# Patient Record
Sex: FEMALE | Race: BLACK OR AFRICAN AMERICAN | Employment: OTHER | URBAN - METROPOLITAN AREA
[De-identification: names, ages, dates, MRNs, and addresses within clinical notes are randomized per-mention and may not be internally consistent; named-entity substitution may affect disease eponyms.]

---

## 2017-04-17 ENCOUNTER — TELEPHONE (OUTPATIENT)
Dept: PODIATRY CLINIC | Facility: CLINIC | Age: 77
End: 2017-04-17

## 2017-04-25 ENCOUNTER — OFFICE VISIT (OUTPATIENT)
Dept: PODIATRY CLINIC | Facility: CLINIC | Age: 77
End: 2017-04-25

## 2017-04-25 DIAGNOSIS — E11.9 TYPE 2 DIABETES MELLITUS WITHOUT COMPLICATION, WITHOUT LONG-TERM CURRENT USE OF INSULIN (HCC): Primary | ICD-10-CM

## 2017-04-25 PROCEDURE — 99203 OFFICE O/P NEW LOW 30 MIN: CPT | Performed by: PODIATRIST

## 2017-04-25 PROCEDURE — G0463 HOSPITAL OUTPT CLINIC VISIT: HCPCS | Performed by: PODIATRIST

## 2017-04-25 NOTE — PROGRESS NOTES
HPI:    Patient ID: Deb Gutierrez is a 78year old female. HPI  This pleasant 27-year-old female presents as a new patient to me and states that she is self-referred. She states she is here for a diabetic foot evaluation.   She states that she has been [facility]

## 2021-02-03 ENCOUNTER — OFFICE VISIT (OUTPATIENT)
Dept: FAMILY MEDICINE CLINIC | Facility: CLINIC | Age: 81
End: 2021-02-03
Payer: MEDICARE

## 2021-02-03 VITALS
HEIGHT: 62 IN | DIASTOLIC BLOOD PRESSURE: 84 MMHG | SYSTOLIC BLOOD PRESSURE: 146 MMHG | TEMPERATURE: 99 F | BODY MASS INDEX: 34.38 KG/M2 | HEART RATE: 105 BPM | RESPIRATION RATE: 18 BRPM | WEIGHT: 186.81 LBS

## 2021-02-03 DIAGNOSIS — Z79.4 TYPE 2 DIABETES MELLITUS WITHOUT COMPLICATION, WITH LONG-TERM CURRENT USE OF INSULIN (HCC): ICD-10-CM

## 2021-02-03 DIAGNOSIS — Z85.3 HISTORY OF BREAST CANCER: ICD-10-CM

## 2021-02-03 DIAGNOSIS — Z12.31 BREAST CANCER SCREENING BY MAMMOGRAM: ICD-10-CM

## 2021-02-03 DIAGNOSIS — R25.1 TREMOR: ICD-10-CM

## 2021-02-03 DIAGNOSIS — E11.9 TYPE 2 DIABETES MELLITUS WITHOUT COMPLICATION, WITH LONG-TERM CURRENT USE OF INSULIN (HCC): ICD-10-CM

## 2021-02-03 DIAGNOSIS — G89.29 CHRONIC PAIN OF RIGHT KNEE: ICD-10-CM

## 2021-02-03 DIAGNOSIS — E78.5 HYPERLIPIDEMIA, UNSPECIFIED HYPERLIPIDEMIA TYPE: ICD-10-CM

## 2021-02-03 DIAGNOSIS — I10 ESSENTIAL HYPERTENSION: ICD-10-CM

## 2021-02-03 DIAGNOSIS — M25.561 CHRONIC PAIN OF RIGHT KNEE: ICD-10-CM

## 2021-02-03 DIAGNOSIS — R49.9 CHANGE IN VOICE: ICD-10-CM

## 2021-02-03 DIAGNOSIS — Z00.00 ANNUAL PHYSICAL EXAM: Primary | ICD-10-CM

## 2021-02-03 PROCEDURE — G0439 PPPS, SUBSEQ VISIT: HCPCS | Performed by: FAMILY MEDICINE

## 2021-02-03 PROCEDURE — 3008F BODY MASS INDEX DOCD: CPT | Performed by: FAMILY MEDICINE

## 2021-02-03 PROCEDURE — 3077F SYST BP >= 140 MM HG: CPT | Performed by: FAMILY MEDICINE

## 2021-02-03 PROCEDURE — 3079F DIAST BP 80-89 MM HG: CPT | Performed by: FAMILY MEDICINE

## 2021-02-03 PROCEDURE — 96160 PT-FOCUSED HLTH RISK ASSMT: CPT | Performed by: FAMILY MEDICINE

## 2021-02-03 PROCEDURE — 99387 INIT PM E/M NEW PAT 65+ YRS: CPT | Performed by: FAMILY MEDICINE

## 2021-02-03 RX ORDER — LATANOPROST 50 UG/ML
1 SOLUTION/ DROPS OPHTHALMIC NIGHTLY
COMMUNITY
End: 2021-02-14

## 2021-02-03 RX ORDER — PEN NEEDLE, DIABETIC 32GX 5/32"
NEEDLE, DISPOSABLE MISCELLANEOUS
Qty: 100 EACH | Refills: 1 | Status: SHIPPED | OUTPATIENT
Start: 2021-02-03 | End: 2022-01-17

## 2021-02-03 RX ORDER — BLOOD SUGAR DIAGNOSTIC
STRIP MISCELLANEOUS
COMMUNITY
Start: 2021-01-23 | End: 2021-09-08

## 2021-02-03 RX ORDER — ATORVASTATIN CALCIUM 80 MG/1
80 TABLET, FILM COATED ORAL DAILY
COMMUNITY
End: 2021-02-14

## 2021-02-03 RX ORDER — FLUTICASONE PROPIONATE 50 MCG
SPRAY, SUSPENSION (ML) NASAL DAILY
COMMUNITY

## 2021-02-03 RX ORDER — PEN NEEDLE, DIABETIC 32GX 5/32"
NEEDLE, DISPOSABLE MISCELLANEOUS
COMMUNITY
Start: 2020-11-23 | End: 2021-02-03

## 2021-02-03 RX ORDER — LISINOPRIL 10 MG/1
TABLET ORAL
COMMUNITY
Start: 2020-11-18 | End: 2021-02-16

## 2021-02-03 NOTE — PROGRESS NOTES
HPI:   Jerry Mcbride is a 80year old female who presents for a Medicare annual physical exam.   New patient, looking to establish care. Type 2 diabetes-chronic issue for patient. Has not had labs in a while. Takes insulin and Metformin.   Feels Metformin and Family/surrogate (if present), and forms available to patient in AVS       She has never smoked tobacco.    CAGE Alcohol screening   Star Lua was screened for Alcohol abuse and had a score of 0 so is at low risk.     Patient Care Team: Patient Care T She reports that she does not drink alcohol or use drugs. REVIEW OF SYSTEMS:   Review of Systems   All other systems reviewed and are negative.     EXAM:   /84   Pulse 105   Temp 98.7 °F (37.1 °C) (Temporal)   Resp 18   Ht 5' 2\" (1.575 m)   Wt 18 ASSESSMENT AND OTHER RELEVANT CHRONIC CONDITIONS:   Shayna Zepeda is a 80year old female who presents for a Medicare Assessment.      PLAN SUMMARY:   Diagnoses and all orders for this visit:    Annual physical exam  -     CBC WITH DIFFERENTIAL WITH PLATE No  How does the patient maintain a good energy level?: Daily Walks  How would you describe your current health state?: Fair  How do you maintain positive mental well-being?: Games;Puzzles      This section provided for quick review of chart, silvina juarez Annually No vaccine history found Please get every year    Pneumococcal 13 (Prevnar)  Covered Once after 65 No vaccine history found Please get once after your 65th birthday    Pneumococcal 23 (Pneumovax)  Covered Once after 65 No vaccine history found Ple

## 2021-02-04 LAB
ABSOLUTE BASOPHILS: 40 CELLS/UL (ref 0–200)
ABSOLUTE EOSINOPHILS: 119 CELLS/UL (ref 15–500)
ABSOLUTE LYMPHOCYTES: 1747 CELLS/UL (ref 850–3900)
ABSOLUTE MONOCYTES: 370 CELLS/UL (ref 200–950)
ABSOLUTE NEUTROPHILS: 2125 CELLS/UL (ref 1500–7800)
ALBUMIN/GLOBULIN RATIO: 1.2 (CALC) (ref 1–2.5)
ALBUMIN: 4.1 G/DL (ref 3.6–5.1)
ALKALINE PHOSPHATASE: 79 U/L (ref 37–153)
ALT: 13 U/L (ref 6–29)
AST: 13 U/L (ref 10–35)
BASOPHILS: 0.9 %
BILIRUBIN, TOTAL: 0.4 MG/DL (ref 0.2–1.2)
BUN/CREATININE RATIO: 14 (CALC) (ref 6–22)
BUN: 17 MG/DL (ref 7–25)
CALCIUM: 9.7 MG/DL (ref 8.6–10.4)
CARBON DIOXIDE: 24 MMOL/L (ref 20–32)
CHLORIDE: 104 MMOL/L (ref 98–110)
CHOL/HDLC RATIO: 4.1 (CALC)
CHOLESTEROL, TOTAL: 151 MG/DL
CREATININE, RANDOM URINE: 163 MG/DL (ref 20–275)
CREATININE: 1.2 MG/DL (ref 0.6–0.93)
EGFR IF AFRICN AM: 50 ML/MIN/1.73M2
EGFR IF NONAFRICN AM: 43 ML/MIN/1.73M2
EOSINOPHILS: 2.7 %
GLOBULIN: 3.3 G/DL (CALC) (ref 1.9–3.7)
GLUCOSE: 225 MG/DL (ref 65–99)
HDL CHOLESTEROL: 37 MG/DL
HEMATOCRIT: 37.9 % (ref 35–45)
HEMOGLOBIN A1C: 9.3 % OF TOTAL HGB
HEMOGLOBIN: 12 G/DL (ref 11.7–15.5)
LDL-CHOLESTEROL: 95 MG/DL (CALC)
LYMPHOCYTES: 39.7 %
MCH: 26.4 PG (ref 27–33)
MCHC: 31.7 G/DL (ref 32–36)
MCV: 83.5 FL (ref 80–100)
MICROALBUMIN/CREATININE RATIO, RANDOM URINE: 26 MCG/MG CREAT
MICROALBUMIN: 4.2 MG/DL
MONOCYTES: 8.4 %
MPV: 12.3 FL (ref 7.5–12.5)
NEUTROPHILS: 48.3 %
NON-HDL CHOLESTEROL: 114 MG/DL (CALC)
PLATELET COUNT: 255 THOUSAND/UL (ref 140–400)
POTASSIUM: 4.6 MMOL/L (ref 3.5–5.3)
PROTEIN, TOTAL: 7.4 G/DL (ref 6.1–8.1)
RDW: 13.7 % (ref 11–15)
RED BLOOD CELL COUNT: 4.54 MILLION/UL (ref 3.8–5.1)
SODIUM: 137 MMOL/L (ref 135–146)
TRIGLYCERIDES: 98 MG/DL
TSH W/REFLEX TO FT4: 1.21 MIU/L (ref 0.4–4.5)
WHITE BLOOD CELL COUNT: 4.4 THOUSAND/UL (ref 3.8–10.8)

## 2021-02-05 DIAGNOSIS — Z23 NEED FOR VACCINATION: ICD-10-CM

## 2021-02-09 ENCOUNTER — PATIENT MESSAGE (OUTPATIENT)
Dept: FAMILY MEDICINE CLINIC | Facility: CLINIC | Age: 81
End: 2021-02-09

## 2021-02-09 ENCOUNTER — TELEPHONE (OUTPATIENT)
Dept: CASE MANAGEMENT | Age: 81
End: 2021-02-09

## 2021-02-09 DIAGNOSIS — Z79.4 TYPE 2 DIABETES MELLITUS WITHOUT COMPLICATION, WITH LONG-TERM CURRENT USE OF INSULIN (HCC): Primary | ICD-10-CM

## 2021-02-09 DIAGNOSIS — E11.9 TYPE 2 DIABETES MELLITUS WITHOUT COMPLICATION, WITH LONG-TERM CURRENT USE OF INSULIN (HCC): Primary | ICD-10-CM

## 2021-02-09 NOTE — TELEPHONE ENCOUNTER
Patient has a pending appointment with Dr. Rj Diane on 3/24/2021. Routed to provider to confirm if the patient should be seen sooner.

## 2021-02-09 NOTE — TELEPHONE ENCOUNTER
From: Medina Marley  To: Marcio Hartman MD  Sent: 2/9/2021 3:28 PM CST  Subject: Referral Request    I have a question about COMP METABOLIC PANEL (14) resulted on 2/4/21, 8:16 AM.    The next available is not until 3/24. Is that ok.      Kasey Fields

## 2021-02-09 NOTE — TELEPHONE ENCOUNTER
Sylvain Davis, is there anyway you can accommodate this patient sooner  Type 2 DM, elevated creatinine on insulin with an A1C of 9.3%, thanks

## 2021-02-09 NOTE — TELEPHONE ENCOUNTER
Hi Dr. Jevon Torres,    I am working on the referral you submitted for Nancy to see Dr. Maria C Carroll. Dr. Agata Valerio is not in the patient's network. Please redirect to an [address].     Thank you  Lauren Lanier

## 2021-02-10 ENCOUNTER — PATIENT MESSAGE (OUTPATIENT)
Dept: FAMILY MEDICINE CLINIC | Facility: CLINIC | Age: 81
End: 2021-02-10

## 2021-02-10 ENCOUNTER — PATIENT MESSAGE (OUTPATIENT)
Dept: ENDOCRINOLOGY CLINIC | Facility: CLINIC | Age: 81
End: 2021-02-10

## 2021-02-10 ENCOUNTER — OFFICE VISIT (OUTPATIENT)
Dept: ENDOCRINOLOGY CLINIC | Facility: CLINIC | Age: 81
End: 2021-02-10
Payer: MEDICARE

## 2021-02-10 VITALS
WEIGHT: 188 LBS | BODY MASS INDEX: 34 KG/M2 | HEART RATE: 88 BPM | SYSTOLIC BLOOD PRESSURE: 166 MMHG | DIASTOLIC BLOOD PRESSURE: 82 MMHG

## 2021-02-10 DIAGNOSIS — E11.22 TYPE 2 DIABETES MELLITUS WITH CHRONIC KIDNEY DISEASE, WITH LONG-TERM CURRENT USE OF INSULIN, UNSPECIFIED CKD STAGE (HCC): Primary | ICD-10-CM

## 2021-02-10 DIAGNOSIS — Z79.4 TYPE 2 DIABETES MELLITUS WITH CHRONIC KIDNEY DISEASE, WITH LONG-TERM CURRENT USE OF INSULIN, UNSPECIFIED CKD STAGE (HCC): Primary | ICD-10-CM

## 2021-02-10 DIAGNOSIS — E78.5 DYSLIPIDEMIA: ICD-10-CM

## 2021-02-10 PROBLEM — E11.9 TYPE 2 DIABETES MELLITUS (HCC): Status: ACTIVE | Noted: 2021-02-10

## 2021-02-10 PROCEDURE — 3079F DIAST BP 80-89 MM HG: CPT | Performed by: INTERNAL MEDICINE

## 2021-02-10 PROCEDURE — 3077F SYST BP >= 140 MM HG: CPT | Performed by: INTERNAL MEDICINE

## 2021-02-10 PROCEDURE — 99204 OFFICE O/P NEW MOD 45 MIN: CPT | Performed by: INTERNAL MEDICINE

## 2021-02-10 RX ORDER — METFORMIN HYDROCHLORIDE 500 MG/1
500 TABLET, EXTENDED RELEASE ORAL 2 TIMES DAILY WITH MEALS
Qty: 180 TABLET | Refills: 0 | Status: SHIPPED | OUTPATIENT
Start: 2021-02-10 | End: 2021-02-10

## 2021-02-10 RX ORDER — DULAGLUTIDE 0.75 MG/.5ML
0.75 INJECTION, SOLUTION SUBCUTANEOUS
Qty: 6 ML | Refills: 0 | Status: SHIPPED | OUTPATIENT
Start: 2021-02-10 | End: 2021-02-23

## 2021-02-10 NOTE — TELEPHONE ENCOUNTER
Per Dr. Rj Diane,     Put the patient at 0900 spot for tomorrow but have the patient come at [address] [place] [address]. Patient was notified and reviewed [facility] location.      Future Appointments   Date Time Provider Ebonie Howard   2/10/2021  9:00 AM Kady Camara,

## 2021-02-10 NOTE — TELEPHONE ENCOUNTER
From: Evangelist Aragon  To: Dorota Sheehan MD  Sent: 2/10/2021 4:05 PM CST  Subject: Prescription Question    Hi,      So I hv gone thru my Mom's prescription expenses. Her Lantis is 481 and Trulicity 028 that's just 2 of many.      Because I hv such great Count

## 2021-02-10 NOTE — H&P
New Patient Visit - Diabetes    CONSULT - Reason for Visit:  Diabetes management.     Requesting Physician: Dr. Wright Cabot:  Patient presents with:  Diabetes: Pt is present to establish care with a new endo doctor        HISTORY OF PRESANKIT MASTECTOMY RIGHT         ALLERGIES:    Penicillins             RASH  Whole Food Multivit*    HIVES  Iodine I 131 Tositu*        SOCIAL HISTORY:    Social History    Socioeconomic History      Marital status:        Spouse name: Not on file      Numbe hearing, normal speech  Back: no kyphosis  Respiratory:  Speaking in full sentences, non-labored.  no increased work of breathing, no audible wheezing    Skin:  normal moisture and skin texture, no visible lesions  Hair and nails: normal scalp hair  Hematol therapy  Discussed the potential side effects of statins including muscle and liver injury.     RTC in 3 months  Call with Bg as discussed        2/10/2021  Daxa Berkowitz MD

## 2021-02-10 NOTE — TELEPHONE ENCOUNTER
From: Juliet Esteban  To: Deepak Ramirez MD  Sent: 2/10/2021 1:34 PM CST  Subject: Prescription Question    Hello  My Mother Juliet Esteban was prescribed Trulicity this morning it's $141.  I don't know if she is going to be able to afford this on an ongoing ba

## 2021-02-10 NOTE — TELEPHONE ENCOUNTER
Dr. Drake Esparza,     Please advise on below. Below cost is for the 90 days supply. Copay card will not work as she has Medicare advantage plan.

## 2021-02-10 NOTE — PATIENT INSTRUCTIONS
Continue with lantus   STOP metformin   Start trulicity 5.15 mg once a week  Check your sugars before breakfast maurisio days and before dinner some days  Call me with blood sugars in 2 weeks    [phone]

## 2021-02-10 NOTE — TELEPHONE ENCOUNTER
She has SE with regular  MTF   Given her renal function some medications are not recommended   Please make sure they know that this cost is for 3 months, can they  one month at at time, if not affordable, we could try:   MTF  mg BF and dinner

## 2021-02-11 NOTE — TELEPHONE ENCOUNTER
rn called patient and spoke with Liliana Mondragon , and gave her message below by dr Crissy Herman. she states that per dr Linda Stewart she can't be on metformin given kidney condition? I briefly mentioned Kaur Lucero but can she be on this medication? Please advise.   I explained

## 2021-02-12 ENCOUNTER — PATIENT MESSAGE (OUTPATIENT)
Dept: FAMILY MEDICINE CLINIC | Facility: CLINIC | Age: 81
End: 2021-02-12

## 2021-02-12 NOTE — TELEPHONE ENCOUNTER
Spoke to patient's daughter, Angel Stokes, to relay message below. Daughter stated understanding for patient to continue with MTF until trulicity started.       F/U scheduled 5/12/21    Daughter states insulin in pen is easier for patient than insulin in vials

## 2021-02-12 NOTE — TELEPHONE ENCOUNTER
Trulicity prescription is for 6 ml  Also, she should please take the  mg BID till she gets the trulicity  Her GGFr ( measure your kidney function) is 50, and we typically recommend stopping MTF when it is below 40  Thanks

## 2021-02-12 NOTE — TELEPHONE ENCOUNTER
From: Kasey Kurtz  To: Maria Antonia Landrum MD  Sent: 2/12/2021 11:31 AM CST  Subject: Prescription Question    Gm,     Just wanted to let you know my Mom usually gets the prefilled Lantus and when it was sent to pharmacy it was the vials instead.    She shakes t

## 2021-02-12 NOTE — TELEPHONE ENCOUNTER
Dr. Jillian Helms,  Spoke to [place], patient's daughter, -she states pharmacy won't have Trulicity until Tuesday - asking if patient should continue taking MTF (657IG BID) until trulicity has been picked up -please advise-thanks

## 2021-02-13 NOTE — TELEPHONE ENCOUNTER
[facility] pharmacy is requesting a new rx for   ATORVASTATIN 80 MG   LATANOPROST EYE DROPS   LISINOPRIL 10MG TABLETS    ALSO LISINOPRIL 20MG TABLETS.      PLS ADVISE ON THESE REFILLS

## 2021-02-14 NOTE — TELEPHONE ENCOUNTER
I pended the first 2 meds, but the Lisinopril 10mg does not have a sig, qty or refill info entered on med list.     Also, pt does not have Lisinopril 20mg on her active med list    Please advise.

## 2021-02-15 RX ORDER — ATORVASTATIN CALCIUM 80 MG/1
80 TABLET, FILM COATED ORAL DAILY
Qty: 90 TABLET | Refills: 1 | Status: SHIPPED | OUTPATIENT
Start: 2021-02-15 | End: 2021-09-08

## 2021-02-15 RX ORDER — LATANOPROST 50 UG/ML
1 SOLUTION/ DROPS OPHTHALMIC NIGHTLY
Qty: 3 BOTTLE | Refills: 1 | Status: SHIPPED | OUTPATIENT
Start: 2021-02-15 | End: 2021-04-07

## 2021-02-16 ENCOUNTER — TELEPHONE (OUTPATIENT)
Dept: OBGYN CLINIC | Facility: CLINIC | Age: 81
End: 2021-02-16

## 2021-02-16 RX ORDER — LANCETS
EACH MISCELLANEOUS
Qty: 100 EACH | Refills: 1 | Status: SHIPPED | OUTPATIENT
Start: 2021-02-16 | End: 2021-04-18

## 2021-02-16 RX ORDER — LISINOPRIL 10 MG/1
10 TABLET ORAL DAILY
Qty: 90 TABLET | Refills: 1 | Status: SHIPPED | OUTPATIENT
Start: 2021-02-16 | End: 2021-08-17

## 2021-02-16 NOTE — TELEPHONE ENCOUNTER
Called patient to confirm if refill was needed in separate encounter. Reports she has enough supply of everything except the lancets. LOV 2/10/21. Pended 3 month supply.

## 2021-02-16 NOTE — TELEPHONE ENCOUNTER
I called the patient and daughter who stated she is taking the Lisinopril 10 mg once a day. Received from her previous physician. I pended as per patient stated. We had as historical.   Pended for #90 1 refill.

## 2021-02-16 NOTE — TELEPHONE ENCOUNTER
•  Glucose Blood (ACCU-CHEK ARI PLUS) In Vitro Strip    - ACCU-CHEK ARI SOLUTION    -ACCU-CHEK SOFTCLIX LANCETS    -BD SINGLE USE SWAB

## 2021-02-16 NOTE — TELEPHONE ENCOUNTER
Pharmacy refill request for;    Accu-Check Qiana Plus Meter    •  Dulaglutide (TRULICITY) 4.87 XY/3.5OZ Subcutaneous Solution Pen-injector, Inject 0.75 mg into the skin every 7 days. , Disp: 6 mL, Rfl: 0      •  insulin glargine 100 UNIT/ML Subcutaneous Sol

## 2021-02-16 NOTE — TELEPHONE ENCOUNTER
Refill on 2/03/21. Called patient to confirm. States she did not request the refill and she has enough supply for now.

## 2021-02-17 ENCOUNTER — TELEPHONE (OUTPATIENT)
Dept: NEUROLOGY | Facility: CLINIC | Age: 81
End: 2021-02-17

## 2021-02-17 ENCOUNTER — OFFICE VISIT (OUTPATIENT)
Dept: NEUROLOGY | Facility: CLINIC | Age: 81
End: 2021-02-17
Payer: MEDICARE

## 2021-02-17 VITALS
BODY MASS INDEX: 33.13 KG/M2 | DIASTOLIC BLOOD PRESSURE: 70 MMHG | WEIGHT: 180 LBS | OXYGEN SATURATION: 97 % | SYSTOLIC BLOOD PRESSURE: 160 MMHG | HEIGHT: 62 IN | HEART RATE: 96 BPM

## 2021-02-17 DIAGNOSIS — G20 PARKINSONISM, UNSPECIFIED PARKINSONISM TYPE (HCC): Primary | ICD-10-CM

## 2021-02-17 DIAGNOSIS — R41.3 GRADUAL-ONSET MEMORY IMPAIRMENT: ICD-10-CM

## 2021-02-17 DIAGNOSIS — R49.9 PATHOLOGIC CHANGE OF VOICE: ICD-10-CM

## 2021-02-17 PROCEDURE — 99205 OFFICE O/P NEW HI 60 MIN: CPT | Performed by: OTHER

## 2021-02-17 PROCEDURE — 3078F DIAST BP <80 MM HG: CPT | Performed by: OTHER

## 2021-02-17 PROCEDURE — 3077F SYST BP >= 140 MM HG: CPT | Performed by: OTHER

## 2021-02-17 PROCEDURE — 3008F BODY MASS INDEX DOCD: CPT | Performed by: OTHER

## 2021-02-17 NOTE — TELEPHONE ENCOUNTER
Dr. Ella Mo is out of network with Pt's insurance plan. Will need new referral to either [facility] or Dr. Kt Ye. Will inform Nursing.

## 2021-02-17 NOTE — TELEPHONE ENCOUNTER
Artur for authorization of approval for MRI brain wo cpt code 26410. Authorization Number [id number] effective 02/17/21 to 03/19/21     Raymond Pandya MA O to verify if authorization is required for.  NM BRAIN CORINNE SCAN SPECT (SINGLE) 1 DA

## 2021-02-17 NOTE — PROGRESS NOTES
[address] [address]  [address]  [facility]  [phone]          [facility]  NEW PATIENT EVALUATION  Reason for Admission/Consultation:  Parkinsonism    Requested by:   PCP: Lanre Valerio MD + loss of taste.   -Hypomimia Yes  -Hypophonia Yes    Sleep   -REM behavioral disorder (RBD) symptoms No    Autonomic   -Bowel/bladder control No  -Sialorrhea No  -Swallowing No  -Orthostatic hypotension symptoms Yes    States I can't cry;  States when she No    Psychosis:   Suicidal or homicidal ideations: No  Obsessions, compulsions, or hoarding: No    Gait/Falls:  -only once      (+) anorexia; decreased appetite.      Passed out after getting up from the toilet   was sitting on the toilet; got up, states h •  Accu-Chek Softclix Lancets Does not apply Misc, Use to test blood sugars twice daily. , Disp: 100 each, Rfl: 1  •  latanoprost 0.005 % Ophthalmic Solution, Place 1 drop into the left eye nightly., Disp: 3 Bottle, Rfl: 1  •  Glucose Blood (ACCU-CHEK ROSA apraxia, anosognosia, or R/L confusion.   - Cranial Nerves: No gaze preference. Visual fields: Full pupils are 3 mm briskly constricting to 2 mm and equally round and reactive to light  in a well lit room. EOMI. No nystagmus. No ptosis.  V1-V3 intact B/L to turning  Retropulsion:Initially (+) retropulsion but then later no positive retropulsion       Data reviewed    Test results/Imaging:   No results found for: TSH  Lab Results   Component Value Date    HDL 37 (L) 02/03/2021    LDL 95 02/03/2021    TRIG 98 0 essential tremor that then progresses to parkinsonian symptoms. My suspicion for Parkinson disease was initially higher however, on reviewing the records from her ENT visit from this past March my suspicion may be lower.   She does have exam features that

## 2021-02-18 ENCOUNTER — IMMUNIZATION (OUTPATIENT)
Dept: LAB | Age: 81
End: 2021-02-18
Attending: HOSPITALIST
Payer: MEDICARE

## 2021-02-18 ENCOUNTER — HOSPITAL ENCOUNTER (OUTPATIENT)
Dept: GENERAL RADIOLOGY | Age: 81
Discharge: HOME | End: 2021-02-18
Attending: FAMILY MEDICINE
Payer: MEDICARE

## 2021-02-18 ENCOUNTER — HOSPITAL ENCOUNTER (OUTPATIENT)
Dept: MAMMOGRAPHY | Age: 81
Discharge: HOME | End: 2021-02-18
Attending: FAMILY MEDICINE
Payer: MEDICARE

## 2021-02-18 DIAGNOSIS — Z12.31 BREAST CANCER SCREENING BY MAMMOGRAM: ICD-10-CM

## 2021-02-18 DIAGNOSIS — Z23 NEED FOR VACCINATION: Primary | ICD-10-CM

## 2021-02-18 DIAGNOSIS — G89.29 CHRONIC PAIN OF RIGHT KNEE: ICD-10-CM

## 2021-02-18 DIAGNOSIS — M25.561 CHRONIC PAIN OF RIGHT KNEE: ICD-10-CM

## 2021-02-18 PROCEDURE — 77063 BREAST TOMOSYNTHESIS BI: CPT | Performed by: FAMILY MEDICINE

## 2021-02-18 PROCEDURE — 77067 SCR MAMMO BI INCL CAD: CPT | Performed by: FAMILY MEDICINE

## 2021-02-18 PROCEDURE — 73562 X-RAY EXAM OF KNEE 3: CPT | Performed by: FAMILY MEDICINE

## 2021-02-18 PROCEDURE — 0001A SARSCOV2 VAC 30MCG/0.3ML IM: CPT

## 2021-02-18 PROCEDURE — 73564 X-RAY EXAM KNEE 4 OR MORE: CPT | Performed by: FAMILY MEDICINE

## 2021-02-23 ENCOUNTER — OFFICE VISIT (OUTPATIENT)
Dept: PHYSICAL THERAPY | Age: 81
End: 2021-02-23
Attending: Other
Payer: MEDICARE

## 2021-02-23 ENCOUNTER — PATIENT MESSAGE (OUTPATIENT)
Dept: FAMILY MEDICINE CLINIC | Facility: CLINIC | Age: 81
End: 2021-02-23

## 2021-02-23 ENCOUNTER — TELEPHONE (OUTPATIENT)
Dept: NEUROLOGY | Facility: CLINIC | Age: 81
End: 2021-02-23

## 2021-02-23 ENCOUNTER — TELEPHONE (OUTPATIENT)
Dept: PHYSICAL THERAPY | Facility: HOSPITAL | Age: 81
End: 2021-02-23

## 2021-02-23 DIAGNOSIS — R49.9 PATHOLOGIC CHANGE OF VOICE: ICD-10-CM

## 2021-02-23 DIAGNOSIS — R41.3 GRADUAL-ONSET MEMORY IMPAIRMENT: ICD-10-CM

## 2021-02-23 DIAGNOSIS — G89.29 CHRONIC PAIN OF RIGHT KNEE: Primary | ICD-10-CM

## 2021-02-23 DIAGNOSIS — G20 PARKINSONISM, UNSPECIFIED PARKINSONISM TYPE (HCC): ICD-10-CM

## 2021-02-23 DIAGNOSIS — M25.561 CHRONIC PAIN OF RIGHT KNEE: Primary | ICD-10-CM

## 2021-02-23 DIAGNOSIS — G20 PARKINSONISM, UNSPECIFIED PARKINSONISM TYPE (HCC): Primary | ICD-10-CM

## 2021-02-23 PROCEDURE — 97162 PT EVAL MOD COMPLEX 30 MIN: CPT

## 2021-02-23 PROCEDURE — 97110 THERAPEUTIC EXERCISES: CPT

## 2021-02-23 RX ORDER — BLOOD GLUCOSE CONTROL HIGH,LOW
EACH MISCELLANEOUS
Qty: 1 EACH | Refills: 1 | Status: SHIPPED | OUTPATIENT
Start: 2021-02-23

## 2021-02-23 RX ORDER — DULAGLUTIDE 0.75 MG/.5ML
0.75 INJECTION, SOLUTION SUBCUTANEOUS
Qty: 6 ML | Refills: 0 | Status: SHIPPED | OUTPATIENT
Start: 2021-02-23 | End: 2021-04-19 | Stop reason: ALTCHOICE

## 2021-02-23 RX ORDER — GLUCOSAMINE HCL/CHONDROITIN SU 500-400 MG
CAPSULE ORAL
Qty: 200 EACH | Refills: 0 | Status: SHIPPED | OUTPATIENT
Start: 2021-02-23

## 2021-02-23 NOTE — PROGRESS NOTES
NEUROLOGICAL EVALUATION:   Referring Physician: Dr. Babita Espinosa  Diagnosis: Parkinsonism     Date of Service: 2/23/2021     PATIENT SUMMARY   Harry Barron is a 80year old female who presents to therapy today with complaints of difficulty with walking, feeling consistent with diagnosis of decline in function secondary to Parkinsonism. Pt and PT discussed evaluation findings, pathology, POC and HEP. Pt voiced understanding and performs HEP correctly without reported pain.  Skilled Physical Therapy is medically ne decreased arm swing. Timed Up and Go (AD, time): 26 sec  Fall Risk: yes  Dynamic Gait Index Score: 8/12 Fall Risk: yes  Falls efficacy scale 40/64 - Indicates high concern for risk of falls  Today’s Treatment and Response:  Instruction in 1st exercise of questions, please contact me at Dept: [phone]    Sincerely,  Electronically signed by therapist: Srinivas Cox, PT  [de-identified] certification required: Yes  I certify the need for these services furnished under this plan of treatment and while under

## 2021-02-23 NOTE — TELEPHONE ENCOUNTER
Maritza from [facility] called in to get refill on prescription Trulicity, new meter, swabs, and control solution.  Please follow up

## 2021-02-23 NOTE — TELEPHONE ENCOUNTER
Dr. Sky Frederick patient/daughter inquiring on 2/18/21 xray right knee results. Thank you. CONCLUSION:      1. Mild osteoarthritis of the right knee. 2. Arterial atherosclerosis of the distal superficial femoral artery.

## 2021-02-23 NOTE — TELEPHONE ENCOUNTER
From: Lauren Reynolds  To: Markos Santiago MD  Sent: 2/23/2021 11:51 AM CST  Subject: Test Results Question    Gm   Just following up to see if there will be a course of action for her knee after the X-ray results.  We got the message about Mammogram but nothing

## 2021-02-23 NOTE — TELEPHONE ENCOUNTER
Speech therapy order needed for LSVT LOUD. Per office note, patient to get PT with speech therapy. Order pended to Dr. Agata Hermosillo to sign.

## 2021-02-24 ENCOUNTER — TELEPHONE (OUTPATIENT)
Dept: PHYSICAL THERAPY | Facility: HOSPITAL | Age: 81
End: 2021-02-24

## 2021-03-01 ENCOUNTER — OFFICE VISIT (OUTPATIENT)
Dept: PHYSICAL THERAPY | Age: 81
End: 2021-03-01
Attending: Other
Payer: MEDICARE

## 2021-03-01 ENCOUNTER — OFFICE VISIT (OUTPATIENT)
Dept: SPEECH THERAPY | Age: 81
End: 2021-03-01
Attending: Other
Payer: MEDICARE

## 2021-03-01 DIAGNOSIS — R49.9 PATHOLOGIC CHANGE OF VOICE: ICD-10-CM

## 2021-03-01 DIAGNOSIS — G20 PARKINSONISM, UNSPECIFIED PARKINSONISM TYPE (HCC): ICD-10-CM

## 2021-03-01 DIAGNOSIS — R41.3 GRADUAL-ONSET MEMORY IMPAIRMENT: ICD-10-CM

## 2021-03-01 PROCEDURE — 92507 TX SP LANG VOICE COMM INDIV: CPT

## 2021-03-01 PROCEDURE — 92524 BEHAVRAL QUALIT ANALYS VOICE: CPT

## 2021-03-01 PROCEDURE — 97110 THERAPEUTIC EXERCISES: CPT

## 2021-03-01 PROCEDURE — 97116 GAIT TRAINING THERAPY: CPT

## 2021-03-01 PROCEDURE — 97112 NEUROMUSCULAR REEDUCATION: CPT

## 2021-03-01 NOTE — PROGRESS NOTES
[facility] VOICE THERAPY (LSVT) EVALUATION:   Referring Physician: Dr. Eliceo Barajas  Diagnosis: Dysphonia R49.0     Date of Service: 3/1/2021     PATIENT SUMMARY   Teresa Doshi is a 80year old female who participated in a voice evaluation today within the [place] Swabs 70 % Does not apply Pads, Use to clean skin before blood sugars check and before injections, Disp: 200 each, Rfl: 0    •  carbidopa-levodopa  MG Oral Tab, Take 0.5 tablets by mouth 3 (three) times daily for 14 days, THEN 1 tablet 3 (three) time pressure level (SPL, acoustic correlate of vocal loudness) measured with a sound level meter at a distance of approximately 50 cm from the patient's mouth during three voice and speech tasks revealed the following average vocal SPL numbers:     VOWELS: 65 literature as efficacious for individuals with Parkinson Disease. Goals: (to be met in 16 visits)  1.  Pt will increase vocal intensity to reach a target sound pressure level (SPL) of 70-75 dB while sustaining \"ah\" with good vocal quality for a minimu

## 2021-03-01 NOTE — PROGRESS NOTES
Diagnosis: Parkinsonism    Precautions: Breast Cancer 2008, Mastectomy, chemotherapy, Diabetes, HTN, High Cholesterol.   Insurance Type (# Auth): [employer] Medicare (8) Total Timed Treatment: 50 min  Date POC Expires: 5/24/2021     Total Treatment time: 50 min safety during gait. Importance of performing exercises 2 x daily, 7 days per week. Discussed per cart pt should be taking her medication 3 x per day as prescribed rather than 1 x per day as she has been doing.  Pt's daughter spoke with PA to confirm correct

## 2021-03-02 ENCOUNTER — OFFICE VISIT (OUTPATIENT)
Dept: SPEECH THERAPY | Age: 81
End: 2021-03-02
Attending: Other
Payer: MEDICARE

## 2021-03-02 ENCOUNTER — OFFICE VISIT (OUTPATIENT)
Dept: PHYSICAL THERAPY | Age: 81
End: 2021-03-02
Attending: Other
Payer: MEDICARE

## 2021-03-02 PROCEDURE — 92507 TX SP LANG VOICE COMM INDIV: CPT

## 2021-03-02 PROCEDURE — 97112 NEUROMUSCULAR REEDUCATION: CPT

## 2021-03-02 PROCEDURE — 97116 GAIT TRAINING THERAPY: CPT

## 2021-03-02 PROCEDURE — 97110 THERAPEUTIC EXERCISES: CPT

## 2021-03-02 NOTE — PROGRESS NOTES
Diagnosis: Parkinsonism    Precautions: Breast Cancer 2008, Mastectomy, chemotherapy, Diabetes, HTN, High Cholesterol.   Insurance Type (# Auth): [employer] Medicare (8) Total Timed Treatment: 50 min  Date POC Expires: 5/24/2021     Total Treatment time: 50 min side reach, fwd step, swd step, bwd step each x 4, rock and reach x 10, twist and reach x 10, sit to stand x 5                                                                                    Education: Big posture, safety during gait.  Importance of perf

## 2021-03-02 NOTE — PROGRESS NOTES
Diagnosis: Dysphonia R49.0      Precautions: Fall  Insurance Type (# Auth): Medicare (10 visits)   Date POC Expires: 5/30/2021      Total Treatment time: 60 min  Charges: [id number]    Treatment Number: 2/17    Subjective: Patient was cooperative and very pleas

## 2021-03-03 ENCOUNTER — OFFICE VISIT (OUTPATIENT)
Dept: PHYSICAL THERAPY | Age: 81
End: 2021-03-03
Attending: Other
Payer: MEDICARE

## 2021-03-03 ENCOUNTER — OFFICE VISIT (OUTPATIENT)
Dept: SPEECH THERAPY | Age: 81
End: 2021-03-03
Attending: Other
Payer: MEDICARE

## 2021-03-03 PROCEDURE — 97116 GAIT TRAINING THERAPY: CPT

## 2021-03-03 PROCEDURE — 97110 THERAPEUTIC EXERCISES: CPT

## 2021-03-03 PROCEDURE — 97112 NEUROMUSCULAR REEDUCATION: CPT

## 2021-03-03 PROCEDURE — 92507 TX SP LANG VOICE COMM INDIV: CPT

## 2021-03-03 NOTE — PROGRESS NOTES
Diagnosis: Parkinsonism    Precautions: Breast Cancer 2008, Mastectomy, chemotherapy, Diabetes, HTN, High Cholesterol.   Insurance Type (# Auth): [employer] Medicare (8) Total Timed Treatment: 50 min  Date POC Expires: 5/24/2021     Total Treatment time: 50 min fear of falling fall efficacy scale to 24/64  4.  Pt will be independent in daily maximal BIG exercises and be consistent with performing 2 x daily      HEP: Above exercises, floor to ceiling reach, side to side reach, fwd step, swd step, bwd step each x 4,

## 2021-03-03 NOTE — PROGRESS NOTES
Diagnosis: Dysphonia R49.0      Precautions: Fall  Insurance Type (# Auth): Medicare (10 visits)   Date POC Expires: 5/30/2021      Total Treatment time: 60 min  Charges: [id number]    Treatment Number: 3/17    Subjective: Patient was cooperative and very pleas

## 2021-03-04 ENCOUNTER — OFFICE VISIT (OUTPATIENT)
Dept: PHYSICAL THERAPY | Age: 81
End: 2021-03-04
Attending: Other
Payer: MEDICARE

## 2021-03-04 ENCOUNTER — OFFICE VISIT (OUTPATIENT)
Dept: SPEECH THERAPY | Age: 81
End: 2021-03-04
Attending: Other
Payer: MEDICARE

## 2021-03-04 PROCEDURE — 97116 GAIT TRAINING THERAPY: CPT

## 2021-03-04 PROCEDURE — 97110 THERAPEUTIC EXERCISES: CPT

## 2021-03-04 PROCEDURE — 92507 TX SP LANG VOICE COMM INDIV: CPT

## 2021-03-04 PROCEDURE — 97112 NEUROMUSCULAR REEDUCATION: CPT

## 2021-03-04 NOTE — PROGRESS NOTES
Diagnosis: Parkinsonism    Precautions: Breast Cancer 2008, Mastectomy, chemotherapy, Diabetes, HTN, High Cholesterol.   Insurance Type (# Auth): [employer] Medicare (8) Total Timed Treatment: 45 min  Date POC Expires: 5/24/2021     Total Treatment time: 45 min to use a CP on the R knee before bedtime to decrease pain at the end of the day and while sleeping. Importance of performing exercises 2 x daily, 7 days per week. Assessment: Pt requires verbal cues to maintain upright posture, reviewed all exercises.

## 2021-03-04 NOTE — PROGRESS NOTES
Diagnosis: Dysphonia R49.0      Precautions: Fall  Insurance Type (# Auth): Medicare (10 visits)   Date POC Expires: 5/30/2021      Total Treatment time: 60 min  Charges: [id number]    Treatment Number: 4/17    Subjective: Patient was cooperative and very pleas

## 2021-03-05 ENCOUNTER — HOSPITAL ENCOUNTER (OUTPATIENT)
Dept: MRI IMAGING | Facility: HOSPITAL | Age: 81
Discharge: HOME | End: 2021-03-05
Attending: Other
Payer: MEDICARE

## 2021-03-05 DIAGNOSIS — R49.9 PATHOLOGIC CHANGE OF VOICE: ICD-10-CM

## 2021-03-05 DIAGNOSIS — G20 PARKINSONISM, UNSPECIFIED PARKINSONISM TYPE (HCC): ICD-10-CM

## 2021-03-05 DIAGNOSIS — R41.3 GRADUAL-ONSET MEMORY IMPAIRMENT: ICD-10-CM

## 2021-03-05 PROCEDURE — 70551 MRI BRAIN STEM W/O DYE: CPT | Performed by: OTHER

## 2021-03-08 ENCOUNTER — OFFICE VISIT (OUTPATIENT)
Dept: PHYSICAL THERAPY | Age: 81
End: 2021-03-08
Attending: Other
Payer: MEDICARE

## 2021-03-08 ENCOUNTER — OFFICE VISIT (OUTPATIENT)
Dept: SPEECH THERAPY | Age: 81
End: 2021-03-08
Attending: Other
Payer: MEDICARE

## 2021-03-08 PROCEDURE — 97110 THERAPEUTIC EXERCISES: CPT

## 2021-03-08 PROCEDURE — 97116 GAIT TRAINING THERAPY: CPT

## 2021-03-08 PROCEDURE — 92507 TX SP LANG VOICE COMM INDIV: CPT

## 2021-03-08 PROCEDURE — 97112 NEUROMUSCULAR REEDUCATION: CPT

## 2021-03-08 NOTE — PROGRESS NOTES
Diagnosis: Parkinsonism    Precautions: Breast Cancer 2008, Mastectomy, chemotherapy, Diabetes, HTN, High Cholesterol.   Insurance Type (# Auth): [employer] Medicare (8) Total Timed Treatment: 45 min  Date POC Expires: 5/24/2021     Total Treatment time: 45 min CP on the R knee before bedtime to decrease pain at the end of the day. Importance of performing exercises 2 x daily, 7 days per week. Assessment: Pt is compliant with LSVT exercise program. Pt was able to perform exercises without modification.      P

## 2021-03-08 NOTE — PROGRESS NOTES
Diagnosis: Dysphonia R49.0      Precautions: Fall  Insurance Type (# Auth): Medicare (10 visits)   Date POC Expires: 5/30/2021      Total Treatment time: 60 min  Charges: [id number]    Treatment Number: 5/17    Subjective: Patient was cooperative and very pleas outside of therapy office to maximize carryover, in addition to the regular daily variables.

## 2021-03-09 ENCOUNTER — OFFICE VISIT (OUTPATIENT)
Dept: SPEECH THERAPY | Age: 81
End: 2021-03-09
Attending: Other
Payer: MEDICARE

## 2021-03-09 ENCOUNTER — OFFICE VISIT (OUTPATIENT)
Dept: PHYSICAL THERAPY | Age: 81
End: 2021-03-09
Attending: Other
Payer: MEDICARE

## 2021-03-09 PROCEDURE — 92507 TX SP LANG VOICE COMM INDIV: CPT

## 2021-03-09 PROCEDURE — 97116 GAIT TRAINING THERAPY: CPT

## 2021-03-09 PROCEDURE — 97112 NEUROMUSCULAR REEDUCATION: CPT

## 2021-03-09 PROCEDURE — 97110 THERAPEUTIC EXERCISES: CPT

## 2021-03-09 NOTE — PROGRESS NOTES
Diagnosis: Parkinsonism    Precautions: Breast Cancer 2008, Mastectomy, chemotherapy, Diabetes, HTN, High Cholesterol.   Insurance Type (# Auth): [employer] Medicare (8) Total Timed Treatment: 45 min  Date POC Expires: 5/24/2021     Total Treatment time: 45 min of performing exercises 2 x daily, 7 days per week. Assessment: Discussed taking it easy today is fine, completing 7 daily maximal exercises is necessary.     Plan: Continue PT 4 x per week for LSVT BIG program. Continue to increase distance walked duri

## 2021-03-10 ENCOUNTER — OFFICE VISIT (OUTPATIENT)
Dept: PHYSICAL THERAPY | Age: 81
End: 2021-03-10
Attending: Other
Payer: MEDICARE

## 2021-03-10 ENCOUNTER — OFFICE VISIT (OUTPATIENT)
Dept: SPEECH THERAPY | Age: 81
End: 2021-03-10
Attending: Other
Payer: MEDICARE

## 2021-03-10 PROCEDURE — 97112 NEUROMUSCULAR REEDUCATION: CPT

## 2021-03-10 PROCEDURE — 97116 GAIT TRAINING THERAPY: CPT

## 2021-03-10 PROCEDURE — 97110 THERAPEUTIC EXERCISES: CPT

## 2021-03-10 PROCEDURE — 92507 TX SP LANG VOICE COMM INDIV: CPT

## 2021-03-10 NOTE — PROGRESS NOTES
Diagnosis: Parkinsonism    Precautions: Breast Cancer 2008, Mastectomy, chemotherapy, Diabetes, HTN, High Cholesterol.   Insurance Type (# Auth): [employer] Medicare (8) Total Timed Treatment: 45 min  Date POC Expires: 5/24/2021     Total Treatment time: 45 min outdoors. Assessment: Pt again preferred to take it easy with walking today, pt is fairly active at home, she lives alone and completes all tasks by herself within the home. Pt is compliant with completing 7 daily maximal exercises 2 x per day.     Plan:

## 2021-03-10 NOTE — PROGRESS NOTES
Diagnosis: Dysphonia R49.0      Precautions: Fall  Insurance Type (# Auth): Medicare (8 approved)   Date POC Expires: 5/30/2021      Total Treatment time: 60 min  Charges: [id number]    Treatment Number: 7/17    Subjective: She passed a temperature screening an the regular daily variables.

## 2021-03-11 ENCOUNTER — IMMUNIZATION (OUTPATIENT)
Dept: LAB | Age: 81
End: 2021-03-11
Attending: HOSPITALIST
Payer: MEDICARE

## 2021-03-11 ENCOUNTER — OFFICE VISIT (OUTPATIENT)
Dept: PHYSICAL THERAPY | Age: 81
End: 2021-03-11
Attending: Other
Payer: MEDICARE

## 2021-03-11 ENCOUNTER — OFFICE VISIT (OUTPATIENT)
Dept: SPEECH THERAPY | Age: 81
End: 2021-03-11
Attending: Other
Payer: MEDICARE

## 2021-03-11 DIAGNOSIS — Z23 NEED FOR VACCINATION: Primary | ICD-10-CM

## 2021-03-11 PROCEDURE — 97112 NEUROMUSCULAR REEDUCATION: CPT

## 2021-03-11 PROCEDURE — 92507 TX SP LANG VOICE COMM INDIV: CPT

## 2021-03-11 PROCEDURE — 0002A SARSCOV2 VAC 30MCG/0.3ML IM: CPT

## 2021-03-11 PROCEDURE — 97110 THERAPEUTIC EXERCISES: CPT

## 2021-03-11 PROCEDURE — 97116 GAIT TRAINING THERAPY: CPT

## 2021-03-11 NOTE — PROGRESS NOTES
Diagnosis: Dysphonia R49.0      Precautions: Fall  Insurance Type (# Auth): Medicare (8 approved)   Date POC Expires: 5/30/2021      Total Treatment time: 60 min  Charges: [id number]    Treatment Number: 8/17    Subjective: She passed a temperature screening an regular daily variables.

## 2021-03-11 NOTE — PROGRESS NOTES
Diagnosis: Parkinsonism    Precautions: Breast Cancer 2008, Mastectomy, chemotherapy, Diabetes, HTN, High Cholesterol.   Insurance Type (# Auth): [employer] Medicare (8) Total Timed Treatment: 45 min  Date POC Expires: 5/24/2021     Total Treatment time: 45 min to walk outdoors. Assessment: Pt  Pt is compliant with completing 7 daily maximal exercises 2 x per day. Plan: Continue PT 4 x per week for LSVT BIG program. Repeat fall efficacy scale assessment at next visit.  Continue to increase distance walked du

## 2021-03-12 ENCOUNTER — TELEPHONE (OUTPATIENT)
Dept: PHYSICAL THERAPY | Facility: HOSPITAL | Age: 81
End: 2021-03-12

## 2021-03-15 ENCOUNTER — APPOINTMENT (OUTPATIENT)
Dept: SPEECH THERAPY | Age: 81
End: 2021-03-15
Attending: Other
Payer: MEDICARE

## 2021-03-15 ENCOUNTER — APPOINTMENT (OUTPATIENT)
Dept: PHYSICAL THERAPY | Age: 81
End: 2021-03-15
Attending: Other
Payer: MEDICARE

## 2021-03-16 ENCOUNTER — APPOINTMENT (OUTPATIENT)
Dept: SPEECH THERAPY | Age: 81
End: 2021-03-16
Attending: Other
Payer: MEDICARE

## 2021-03-16 ENCOUNTER — APPOINTMENT (OUTPATIENT)
Dept: PHYSICAL THERAPY | Age: 81
End: 2021-03-16
Attending: Other
Payer: MEDICARE

## 2021-03-17 ENCOUNTER — APPOINTMENT (OUTPATIENT)
Dept: SPEECH THERAPY | Age: 81
End: 2021-03-17
Attending: Other
Payer: MEDICARE

## 2021-03-18 ENCOUNTER — APPOINTMENT (OUTPATIENT)
Dept: PHYSICAL THERAPY | Age: 81
End: 2021-03-18
Attending: Other
Payer: MEDICARE

## 2021-03-18 ENCOUNTER — APPOINTMENT (OUTPATIENT)
Dept: SPEECH THERAPY | Age: 81
End: 2021-03-18
Attending: Other
Payer: MEDICARE

## 2021-03-19 ENCOUNTER — APPOINTMENT (OUTPATIENT)
Dept: PHYSICAL THERAPY | Age: 81
End: 2021-03-19
Attending: Other
Payer: MEDICARE

## 2021-03-22 ENCOUNTER — APPOINTMENT (OUTPATIENT)
Dept: SPEECH THERAPY | Age: 81
End: 2021-03-22
Attending: Other
Payer: MEDICARE

## 2021-03-22 ENCOUNTER — APPOINTMENT (OUTPATIENT)
Dept: PHYSICAL THERAPY | Age: 81
End: 2021-03-22
Attending: Other
Payer: MEDICARE

## 2021-03-22 ENCOUNTER — OFFICE VISIT (OUTPATIENT)
Dept: NEUROLOGY | Facility: CLINIC | Age: 81
End: 2021-03-22
Payer: MEDICARE

## 2021-03-22 VITALS
HEART RATE: 90 BPM | SYSTOLIC BLOOD PRESSURE: 158 MMHG | HEIGHT: 62 IN | BODY MASS INDEX: 33.13 KG/M2 | WEIGHT: 180 LBS | DIASTOLIC BLOOD PRESSURE: 80 MMHG

## 2021-03-22 DIAGNOSIS — R25.1 TREMOR: Primary | ICD-10-CM

## 2021-03-22 PROCEDURE — 3077F SYST BP >= 140 MM HG: CPT | Performed by: OTHER

## 2021-03-22 PROCEDURE — 3008F BODY MASS INDEX DOCD: CPT | Performed by: OTHER

## 2021-03-22 PROCEDURE — 3079F DIAST BP 80-89 MM HG: CPT | Performed by: OTHER

## 2021-03-22 PROCEDURE — 99215 OFFICE O/P EST HI 40 MIN: CPT | Performed by: OTHER

## 2021-03-22 RX ORDER — POLYETHYLENE GLYCOL 3350 17 G/17G
17 POWDER, FOR SOLUTION ORAL DAILY
Qty: 30 PACKET | Refills: 1 | Status: SHIPPED | OUTPATIENT
Start: 2021-03-22 | End: 2021-05-21

## 2021-03-22 NOTE — PROGRESS NOTES
[facility]  [address]  [phone]        Neurology Clinic Follow Up Note    Chief Complaint:  Parkinson's (LOV: 2/17/21- Patient present today to f/u on parkinsons. Patient states she started t past March my suspicion may be lower. She does have exam features that cannot be accounted for by essential tremor alone.   Her daughter also describes short-term memory deficits consistent with a an amnestic profile of MCI.    03/22/21 Interval History/[facility]: Normal excursion of the chest.  No signs of respiratory distress. Neurologic Exam  - Mental Status: Alert and attentive. Oriented x4. Speech is spontaneous, fluent, and prosodic. Comprehension and repetition intact.  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   She will be reverted back to treatment for essential tremor as a treatment for dyst the above. Thank you for allowing me to participate in the care of your patient.     Ra Burkett  3/22/2021

## 2021-03-22 NOTE — PATIENT INSTRUCTIONS
I think most likely you have significant essential tremor and not parkinson disease. What is atypical is you have postural instability when I do that pull test. Lets see how you do on sinemet again. It can also treat the tremor from essential tremor.

## 2021-03-23 ENCOUNTER — APPOINTMENT (OUTPATIENT)
Dept: SPEECH THERAPY | Age: 81
End: 2021-03-23
Attending: Other
Payer: MEDICARE

## 2021-03-23 ENCOUNTER — APPOINTMENT (OUTPATIENT)
Dept: PHYSICAL THERAPY | Age: 81
End: 2021-03-23
Attending: Other
Payer: MEDICARE

## 2021-03-24 ENCOUNTER — APPOINTMENT (OUTPATIENT)
Dept: PHYSICAL THERAPY | Age: 81
End: 2021-03-24
Attending: Other
Payer: MEDICARE

## 2021-03-24 ENCOUNTER — APPOINTMENT (OUTPATIENT)
Dept: SPEECH THERAPY | Age: 81
End: 2021-03-24
Attending: Other
Payer: MEDICARE

## 2021-03-25 ENCOUNTER — APPOINTMENT (OUTPATIENT)
Dept: SPEECH THERAPY | Age: 81
End: 2021-03-25
Attending: Other
Payer: MEDICARE

## 2021-03-25 ENCOUNTER — APPOINTMENT (OUTPATIENT)
Dept: PHYSICAL THERAPY | Age: 81
End: 2021-03-25
Attending: Other
Payer: MEDICARE

## 2021-04-07 ENCOUNTER — OFFICE VISIT (OUTPATIENT)
Dept: OPHTHALMOLOGY | Facility: CLINIC | Age: 81
End: 2021-04-07
Payer: MEDICARE

## 2021-04-07 DIAGNOSIS — H40.1132 PRIMARY OPEN ANGLE GLAUCOMA (POAG) OF BOTH EYES, MODERATE STAGE: Primary | ICD-10-CM

## 2021-04-07 DIAGNOSIS — Z96.1 PSEUDOPHAKIA OF BOTH EYES: ICD-10-CM

## 2021-04-07 DIAGNOSIS — E11.9 DIABETES MELLITUS TYPE 2 WITHOUT RETINOPATHY (HCC): ICD-10-CM

## 2021-04-07 DIAGNOSIS — H43.393 FLOATERS IN VISUAL FIELD, BILATERAL: ICD-10-CM

## 2021-04-07 PROCEDURE — 92015 DETERMINE REFRACTIVE STATE: CPT | Performed by: OPHTHALMOLOGY

## 2021-04-07 PROCEDURE — 92004 COMPRE OPH EXAM NEW PT 1/>: CPT | Performed by: OPHTHALMOLOGY

## 2021-04-07 PROCEDURE — 92250 FUNDUS PHOTOGRAPHY W/I&R: CPT | Performed by: OPHTHALMOLOGY

## 2021-04-07 RX ORDER — LATANOPROST 50 UG/ML
1 SOLUTION/ DROPS OPHTHALMIC NIGHTLY
Qty: 3 BOTTLE | Refills: 3 | Status: SHIPPED | OUTPATIENT
Start: 2021-04-07 | End: 2021-09-08

## 2021-04-07 NOTE — ASSESSMENT & PLAN NOTE
Pt was diagnosed with glaucoma in both eyes in 2018. Continue taking Latanoprost one drop in both eyes at bedtime. Retinal photos taken today to document optic nerves. Glaucoma diagnostic testing ordered.

## 2021-04-07 NOTE — PROGRESS NOTES
Rossana Fernandez is a 80year old female.     HPI:     HPI     Consult      Additional comments: Per Dr Lashell Rosas               Diabetic Eye Exam      Additional comments: Pt has been a diabetic for 40 years       Pt's diabetes is currently controlled by insulin History    Tobacco Use      Smoking status: Never Smoker      Smokeless tobacco: Never Used    Vaping Use      Vaping Use: Never used    Alcohol use: No      Alcohol/week: 0.0 standard drinks    Drug use: No      Medications:  Current Outpatient Medication Allergic/Imm, Heme/Lymph    Last edited by Bella Diez OT on 4/7/2021  1:47 PM. (History)          PHYSICAL EXAM:     Base Eye Exam     Visual Acuity (Snellen - Linear)       Right Left    Dist cc 20/25 +2 20/25 -2    Near cc 20/25 20/30    Correction: ASSESSMENT/PLAN:     Diagnoses and Plan:     Primary open angle glaucoma (POAG) of both eyes, moderate stage  Pt was diagnosed with glaucoma in both eyes in 2018. Continue taking Latanoprost one drop in both eyes at bedtime.     Retinal photos taken tod

## 2021-04-07 NOTE — PATIENT INSTRUCTIONS
Primary open angle glaucoma (POAG) of both eyes, moderate stage  Pt was diagnosed with glaucoma in both eyes in 2018. Continue taking Latanoprost one drop in both eyes at bedtime. Retinal photos taken today to document optic nerves.   Glaucoma diagnost

## 2021-04-13 DIAGNOSIS — R25.1 TREMOR: Primary | ICD-10-CM

## 2021-04-13 NOTE — TELEPHONE ENCOUNTER
Refill request for carbidopa-levodopa  MG Oral Tab, 1 tablet 3 times daily, 90 tabs with 0 refills.      LOV: 3/22/21  NOV: 21    Last filled: 3/22/21-

## 2021-04-15 ENCOUNTER — PATIENT MESSAGE (OUTPATIENT)
Dept: ENDOCRINOLOGY CLINIC | Facility: CLINIC | Age: 81
End: 2021-04-15

## 2021-04-15 ENCOUNTER — TELEPHONE (OUTPATIENT)
Dept: OPHTHALMOLOGY | Facility: CLINIC | Age: 81
End: 2021-04-15

## 2021-04-15 ENCOUNTER — NURSE ONLY (OUTPATIENT)
Dept: OPHTHALMOLOGY | Facility: CLINIC | Age: 81
End: 2021-04-15
Payer: MEDICARE

## 2021-04-15 DIAGNOSIS — H40.1132 PRIMARY OPEN ANGLE GLAUCOMA (POAG) OF BOTH EYES, MODERATE STAGE: ICD-10-CM

## 2021-04-15 PROCEDURE — 76514 ECHO EXAM OF EYE THICKNESS: CPT | Performed by: OPHTHALMOLOGY

## 2021-04-15 PROCEDURE — 92133 CPTRZD OPH DX IMG PST SGM ON: CPT | Performed by: OPHTHALMOLOGY

## 2021-04-15 PROCEDURE — 92083 EXTENDED VISUAL FIELD XM: CPT | Performed by: OPHTHALMOLOGY

## 2021-04-15 NOTE — ASSESSMENT & PLAN NOTE
Abnormal visual field, both eyes. Abnormal OCT, both eyes. Continue Latanoprost, 1 drop, both eyes, at bedtime.

## 2021-04-15 NOTE — PROGRESS NOTES
Kasey Kurtz is a 80year old female.     HPI:     HPI     Pt is here for a VF, OCT and PACHY with no MD.     Last edited by Diamond Jordan OT on 4/15/2021 11:20 AM. (History)        Patient History:  Past Medical History:   Diagnosis Date   • Cancer ([facility] [address] Vitro Solution Use to calibrate machine as needed per  1 each 1   • Alcohol Swabs 70 % Does not apply Pads Use to clean skin before blood sugars check and before injections 200 each 0   • lisinopril 10 MG Oral Tab Take 1 tablet (10 mg total) by

## 2021-04-18 RX ORDER — LANCETS
EACH MISCELLANEOUS
Qty: 200 EACH | Refills: 0 | Status: SHIPPED | OUTPATIENT
Start: 2021-04-18

## 2021-04-18 NOTE — TELEPHONE ENCOUNTER
Please offer one month of bydureon 2 mg once a week  Can give the one month free coupon  Will review other medications at her apt in May 2021  Thanks

## 2021-04-18 NOTE — TELEPHONE ENCOUNTER
From: Rossana Fernandez  To: Adolph Johnson MD  Sent: 4/15/2021 10:06 PM CDT  Subject: Prescription Question    Hello,     My Mom has 1 more weekly dose of Trulicity left which she takes this Sat.  A new script is $700 and she cannot afford that so please advis

## 2021-04-19 ENCOUNTER — OFFICE VISIT (OUTPATIENT)
Dept: NEUROLOGY | Facility: CLINIC | Age: 81
End: 2021-04-19
Payer: MEDICARE

## 2021-04-19 VITALS
SYSTOLIC BLOOD PRESSURE: 146 MMHG | BODY MASS INDEX: 33.13 KG/M2 | HEIGHT: 62 IN | DIASTOLIC BLOOD PRESSURE: 71 MMHG | WEIGHT: 180 LBS

## 2021-04-19 DIAGNOSIS — M79.2 NEUROPATHIC PAIN OF THIGH, RIGHT: ICD-10-CM

## 2021-04-19 DIAGNOSIS — G25.2 DYSTONIC TREMOR: Primary | ICD-10-CM

## 2021-04-19 DIAGNOSIS — R29.898 TRANSIENT RIGHT LEG WEAKNESS: ICD-10-CM

## 2021-04-19 PROCEDURE — 3078F DIAST BP <80 MM HG: CPT | Performed by: OTHER

## 2021-04-19 PROCEDURE — 3077F SYST BP >= 140 MM HG: CPT | Performed by: OTHER

## 2021-04-19 PROCEDURE — 99214 OFFICE O/P EST MOD 30 MIN: CPT | Performed by: OTHER

## 2021-04-19 PROCEDURE — 3008F BODY MASS INDEX DOCD: CPT | Performed by: OTHER

## 2021-04-19 RX ORDER — EXENATIDE 2 MG/.85ML
2 INJECTION, SUSPENSION, EXTENDED RELEASE SUBCUTANEOUS WEEKLY
Qty: 3.4 ML | Refills: 0 | Status: SHIPPED | OUTPATIENT
Start: 2021-04-19 | End: 2021-06-06

## 2021-04-19 RX ORDER — PRIMIDONE 50 MG/1
TABLET ORAL
Qty: 69 TABLET | Refills: 0 | Status: SHIPPED | OUTPATIENT
Start: 2021-04-19 | End: 2021-05-26

## 2021-04-19 RX ORDER — EXENATIDE 2 MG/.85ML
2 INJECTION, SUSPENSION, EXTENDED RELEASE SUBCUTANEOUS WEEKLY
Qty: 3.4 ML | Refills: 0 | Status: SHIPPED | OUTPATIENT
Start: 2021-04-19 | End: 2021-04-19

## 2021-04-19 NOTE — TELEPHONE ENCOUNTER
Spoke to daughter Davina--OK to start Bydureon, explained dosage and sent to [facility] per protocol and called w/ jose ferguson

## 2021-04-19 NOTE — PATIENT INSTRUCTIONS
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 It can cause a tremor. Although you have some symptoms of parkinson disease, I do not think you have parkinson disease.  This is because you the brain,  an irregular jerking may occur. Often this is unbalanced  and the head and neck will twist or jerk more to one side  than the other.  This type of dystonic tremor is usually part  of spasmodic torticollis (also known as cervical dystonia  or CD) levodopa  (Sinemet®). Botulinum toxin injections are quite useful if  a specific muscle group can be identified as the source of  jerking. The treating physician must be skilled in muscle  localization with EMG guidance.  For severe dystonic arm  or leg meera

## 2021-04-19 NOTE — PROGRESS NOTES
[facility]  [address] [facility], [address] [place] [address]  [phone]        Neurology Clinic Follow Up Note    Chief Complaint:  Tremors (LOV 3/22/21. Restarted carbidopa levodopa 25/100 at LOV.  States may have helped tremors i At the end of the last encounter my suspicion the patient had Parkinson disease was much lower. 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   However, the patient was not adherent with her Sinemet, and the plan was to continue Sinemet with [place] Strip Use test strip to test blood sugar twice daily   • insulin glargine (LANTUS) 36 Units, Subcutaneous, Every morning   • latanoprost 0.005 % Ophthalmic Solution 1 drop, Both Eyes, Nightly   • lisinopril 10 mg, Oral, Daily   • Polyethylene Glycol 3350 ( Rapid movements:   Leg Drift: None   R     L   Knee Extensors     Foot Taps: Foot taps are symmetric. Asterixis: No asterixis noted. Tremor:  she has a chin tremor and a lip tremor.  Her Archimedes spiral is c/w essential tremor versus a dystonic trem adequately trialed Sinemet. She is also taking her diabetes medication inappropriately in order to treat her constipation. Her Archimedes spiral today in clinic and her handwriting sample is most consistent with a dystonic tremor.   Her tremor on exam tod for allowing me to participate in the care of your patient.     Brendan Kenny  4/19/2021

## 2021-05-12 ENCOUNTER — LAB ENCOUNTER (OUTPATIENT)
Dept: LAB | Age: 81
End: 2021-05-12
Attending: INTERNAL MEDICINE
Payer: MEDICARE

## 2021-05-12 ENCOUNTER — OFFICE VISIT (OUTPATIENT)
Dept: ENDOCRINOLOGY CLINIC | Facility: CLINIC | Age: 81
End: 2021-05-12
Payer: MEDICARE

## 2021-05-12 VITALS
WEIGHT: 188 LBS | HEART RATE: 96 BPM | BODY MASS INDEX: 34 KG/M2 | SYSTOLIC BLOOD PRESSURE: 163 MMHG | DIASTOLIC BLOOD PRESSURE: 87 MMHG

## 2021-05-12 DIAGNOSIS — E11.22 TYPE 2 DIABETES MELLITUS WITH CHRONIC KIDNEY DISEASE, WITH LONG-TERM CURRENT USE OF INSULIN, UNSPECIFIED CKD STAGE (HCC): Primary | ICD-10-CM

## 2021-05-12 DIAGNOSIS — E78.5 DYSLIPIDEMIA: ICD-10-CM

## 2021-05-12 DIAGNOSIS — Z79.4 TYPE 2 DIABETES MELLITUS WITH CHRONIC KIDNEY DISEASE, WITH LONG-TERM CURRENT USE OF INSULIN, UNSPECIFIED CKD STAGE (HCC): Primary | ICD-10-CM

## 2021-05-12 PROCEDURE — 36415 COLL VENOUS BLD VENIPUNCTURE: CPT | Performed by: INTERNAL MEDICINE

## 2021-05-12 PROCEDURE — 83036 HEMOGLOBIN GLYCOSYLATED A1C: CPT | Performed by: INTERNAL MEDICINE

## 2021-05-12 PROCEDURE — 3079F DIAST BP 80-89 MM HG: CPT | Performed by: INTERNAL MEDICINE

## 2021-05-12 PROCEDURE — 82947 ASSAY GLUCOSE BLOOD QUANT: CPT | Performed by: INTERNAL MEDICINE

## 2021-05-12 PROCEDURE — 3077F SYST BP >= 140 MM HG: CPT | Performed by: INTERNAL MEDICINE

## 2021-05-12 PROCEDURE — 36416 COLLJ CAPILLARY BLOOD SPEC: CPT | Performed by: INTERNAL MEDICINE

## 2021-05-12 PROCEDURE — 99214 OFFICE O/P EST MOD 30 MIN: CPT | Performed by: INTERNAL MEDICINE

## 2021-05-12 PROCEDURE — 80048 BASIC METABOLIC PNL TOTAL CA: CPT | Performed by: INTERNAL MEDICINE

## 2021-05-12 NOTE — PROGRESS NOTES
Return Office Visit     CHIEF COMPLAINT:    DM with CKD  Dyslipidemia    HISTORY OF PRESENT ILLNESS:  Mónica Caro is a 80year old female who presents for follow up for DM.      DM HISTORY  Diagnosed: 28  States she was diagnosed with Type 2 DM        HI machine as needed per  1 each 1   • Alcohol Swabs 70 % Does not apply Pads Use to clean skin before blood sugars check and before injections 200 each 0   • lisinopril 10 MG Oral Tab Take 1 tablet (10 mg total) by mouth daily.  90 tablet 1   • at loss  Head: Atraumatic  Eyes:  normal conjunctivae, sclera. , normal sclera and normal pupils  Throat/Neck: normal sound to voice. Normal hearing, normal speech  Respiratory:  Speaking in full sentences, non-labored.  no increased work of breathing, no audib with PCP  3. Dyslipidemia  A) Discussed age appropriate lifestyle modifications including reductions in dietary total and saturated fat, weight loss, aerobic exercise, and eating a diet rich in fruits and vegetables.   B) Statin therapy  Discussed the adali

## 2021-05-12 NOTE — PATIENT INSTRUCTIONS
Increase lantus from 36 to 40 units daily  Continue with the bydureon 2 mg once a week  Check your sugars before breakfast and before dinner   Call with sugars in 10-14 days  If your sugars do not improve , then we will consider trying the medication we [facility]

## 2021-05-13 ENCOUNTER — TELEPHONE (OUTPATIENT)
Dept: ENDOCRINOLOGY CLINIC | Facility: CLINIC | Age: 81
End: 2021-05-13

## 2021-05-13 NOTE — TELEPHONE ENCOUNTER
Spoke to patient to relay message below - patient stated understanding and denied further questions at this time

## 2021-05-24 ENCOUNTER — TELEPHONE (OUTPATIENT)
Dept: ENDOCRINOLOGY CLINIC | Facility: CLINIC | Age: 81
End: 2021-05-24

## 2021-05-24 NOTE — TELEPHONE ENCOUNTER
Pt called in to give update on blood sugar levels.  This starts on May 13th 2021  am 168, , am 207 195 pm, am 150 172pm. 142 am 144pm, 191 am 198 pm, 181am 154 pm, 178 am 161pm, 172 am 215 pm, 170 am 187 pm, 161am 190 pm 178 am , Please follow up

## 2021-05-25 ENCOUNTER — TELEPHONE (OUTPATIENT)
Dept: NEUROLOGY | Facility: CLINIC | Age: 81
End: 2021-05-25

## 2021-05-25 DIAGNOSIS — G25.2 DYSTONIC TREMOR: ICD-10-CM

## 2021-05-26 RX ORDER — PRIMIDONE 50 MG/1
125 TABLET ORAL NIGHTLY
Qty: 75 TABLET | Refills: 1 | Status: SHIPPED | OUTPATIENT
Start: 2021-05-26 | End: 2021-06-07 | Stop reason: DRUGHIGH

## 2021-05-26 NOTE — TELEPHONE ENCOUNTER
Refill request for PRIMIDONE 50 MG Oral Tab, 2.5 tablets (125 mg total) nightly, 75 Tablets with 0 Refills    LOV: 21  NOV: 21    Last Refilled: 21-

## 2021-06-05 ENCOUNTER — PATIENT MESSAGE (OUTPATIENT)
Dept: ENDOCRINOLOGY CLINIC | Facility: CLINIC | Age: 81
End: 2021-06-05

## 2021-06-06 RX ORDER — EXENATIDE 2 MG/.85ML
2 INJECTION, SUSPENSION, EXTENDED RELEASE SUBCUTANEOUS WEEKLY
Qty: 10.2 ML | Refills: 0 | Status: SHIPPED | OUTPATIENT
Start: 2021-06-06 | End: 2021-09-08

## 2021-06-06 NOTE — TELEPHONE ENCOUNTER
From: Malissa Kc  To: Laurelyn Spatz, MD  Sent: 6/5/2021 7:50 PM CDT  Subject: Prescription Question    Hello    I am out of the medicine I'm suppose to take every Sat. Today was my last dosage. Pls advise.

## 2021-06-07 ENCOUNTER — OFFICE VISIT (OUTPATIENT)
Dept: NEUROLOGY | Facility: CLINIC | Age: 81
End: 2021-06-07
Payer: MEDICARE

## 2021-06-07 VITALS
SYSTOLIC BLOOD PRESSURE: 142 MMHG | DIASTOLIC BLOOD PRESSURE: 66 MMHG | HEART RATE: 106 BPM | BODY MASS INDEX: 33.13 KG/M2 | HEIGHT: 62 IN | WEIGHT: 180 LBS

## 2021-06-07 DIAGNOSIS — G25.2 DYSTONIC TREMOR: Primary | ICD-10-CM

## 2021-06-07 PROCEDURE — 3078F DIAST BP <80 MM HG: CPT | Performed by: OTHER

## 2021-06-07 PROCEDURE — 3008F BODY MASS INDEX DOCD: CPT | Performed by: OTHER

## 2021-06-07 PROCEDURE — 3077F SYST BP >= 140 MM HG: CPT | Performed by: OTHER

## 2021-06-07 PROCEDURE — 99214 OFFICE O/P EST MOD 30 MIN: CPT | Performed by: OTHER

## 2021-06-07 RX ORDER — PRIMIDONE 250 MG/1
125 TABLET ORAL EVERY 12 HOURS
Qty: 90 TABLET | Refills: 1 | Status: SHIPPED | OUTPATIENT
Start: 2021-06-07 | End: 2021-09-08

## 2021-06-07 NOTE — PROGRESS NOTES
[facility]  [address] [facility], [address] [place] [address]  [phone]        Neurology Clinic Follow Up Note    Chief Complaint:  Tremors ([address] 4/19/21- Patient present today to f/u regarding tremors.  Patient states she is starti memory deficits consistent with a an amnestic profile of MCI. At the end of the April 2021 encounter my suspicion the patient had Parkinson disease was much lower. 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 Started on primidone.     06/07/21 primidone 50 MG Oral Tab, Take 2.5 tablets (125 mg total) by mouth nightly., Disp: 75 tablet, Rfl: 1  •  Accu-Chek Softclix Lancets Does not apply Misc, USE TO TEST BLOOD SUGAR TWICE A DAY, Disp: 200 each, Rfl: 0  •  latanoprost 0.005 % Ophthalmic Solution rate are normal. No paraphasic errors, neologisms, anomia, acalculia, apraxia, anosognosia, or R/L confusion.   - Cranial Nerves: No gaze preference.  Visual fields: Full pupils are 4 mm briskly constricting to 3 mm and equally round and reactive to light dysdiadochokinesis. No overshoot.   - Gait/station: Normal gait and station. Symmetric arm swing.     Exam is  changed  on 06/07/21      Most recent lab results:   Reviewed and assessed  No results found for this or any previous visit (from the past 36 hour recognition problems that were not realized and corrected during the completion of the note.       I spent 30 minutes counseling the patient regarding the findings of investigations, treatment options, risks and benefits of treatment, and management of care

## 2021-06-07 NOTE — PATIENT INSTRUCTIONS
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 We will increase the dose of your primidone to 125mg twice a day. Please let me know if you have any side effects. Your blood pressure should be 130/80. Check it at home. Please do not let your sugar levels change greatly.     Pr will affect you. Please check with your doctor before drinking alcohol while on this medicine. Family should check on the patient often. Call the doctor if patient becomes more depressed, has thoughts of suicide, or shows changes in behavior.   Call the d · unusual or unexplained tiredness or weakness  · unsteadiness while walking  · blurring or changes of vision  A few people may have an allergic reactions to this medicine.  Symptoms can include difficulty breathing, skin rash, itching, swelling, or severe

## 2021-07-14 ENCOUNTER — OFFICE VISIT (OUTPATIENT)
Dept: ENDOCRINOLOGY CLINIC | Facility: CLINIC | Age: 81
End: 2021-07-14
Payer: MEDICARE

## 2021-07-14 ENCOUNTER — PATIENT MESSAGE (OUTPATIENT)
Dept: ENDOCRINOLOGY CLINIC | Facility: CLINIC | Age: 81
End: 2021-07-14

## 2021-07-14 VITALS
BODY MASS INDEX: 35 KG/M2 | HEART RATE: 92 BPM | DIASTOLIC BLOOD PRESSURE: 78 MMHG | WEIGHT: 189 LBS | SYSTOLIC BLOOD PRESSURE: 175 MMHG

## 2021-07-14 DIAGNOSIS — E78.5 DYSLIPIDEMIA: ICD-10-CM

## 2021-07-14 DIAGNOSIS — Z79.4 TYPE 2 DIABETES MELLITUS WITH CHRONIC KIDNEY DISEASE, WITH LONG-TERM CURRENT USE OF INSULIN, UNSPECIFIED CKD STAGE (HCC): Primary | ICD-10-CM

## 2021-07-14 DIAGNOSIS — E11.22 TYPE 2 DIABETES MELLITUS WITH CHRONIC KIDNEY DISEASE, WITH LONG-TERM CURRENT USE OF INSULIN, UNSPECIFIED CKD STAGE (HCC): Primary | ICD-10-CM

## 2021-07-14 LAB
CARTRIDGE LOT#: ABNORMAL NUMERIC
GLUCOSE BLOOD: 271
HEMOGLOBIN A1C: 10.1 % (ref 4.3–5.6)
TEST STRIP LOT #: NORMAL NUMERIC

## 2021-07-14 PROCEDURE — 83036 HEMOGLOBIN GLYCOSYLATED A1C: CPT | Performed by: INTERNAL MEDICINE

## 2021-07-14 PROCEDURE — 3078F DIAST BP <80 MM HG: CPT | Performed by: INTERNAL MEDICINE

## 2021-07-14 PROCEDURE — 99214 OFFICE O/P EST MOD 30 MIN: CPT | Performed by: INTERNAL MEDICINE

## 2021-07-14 PROCEDURE — 3077F SYST BP >= 140 MM HG: CPT | Performed by: INTERNAL MEDICINE

## 2021-07-14 PROCEDURE — 36416 COLLJ CAPILLARY BLOOD SPEC: CPT | Performed by: INTERNAL MEDICINE

## 2021-07-14 PROCEDURE — 82947 ASSAY GLUCOSE BLOOD QUANT: CPT | Performed by: INTERNAL MEDICINE

## 2021-07-14 RX ORDER — REPAGLINIDE 1 MG/1
1 TABLET ORAL
Qty: 270 TABLET | Refills: 0 | Status: SHIPPED | OUTPATIENT
Start: 2021-07-14 | End: 2021-09-08

## 2021-07-14 NOTE — PROGRESS NOTES
Return Office Visit     CHIEF COMPLAINT:    DM with CKD  Dyslipidemia    HISTORY OF PRESENT ILLNESS:  Juliet Esteban is a 80year old female who presents for follow up for DM.      DM HISTORY  Diagnosed: 28  States she was diagnosed with Type 2 DM total) by mouth daily. 90 tablet 1   • atorvastatin 80 MG Oral Tab Take 1 tablet (80 mg total) by mouth daily.  90 tablet 1   • Glucose Blood (ACCU-CHEK ARI PLUS) In Vitro Strip Use test strip to test blood sugar twice daily     • Fluticasone Propionate 5 wheezing    Skin:  normal moisture and skin texture, no visible lesions  Hematologic:  no excessive bruising  Neuro: motor grossly intact, moving all extremities without difficulty  Psychiatric:  oriented to time, self, and place  Extremities: no obvious e fruits and vegetables.   B) Statin therapy  Discussed the potential side effects of statins including muscle and liver injury.     RTC in 3 months  Call with Bg as discussed          Orders Placed This Encounter      POC HemoCue Glucose 201 (Finger stick gl

## 2021-07-14 NOTE — PATIENT INSTRUCTIONS
Lantus 45 units daily  Bydureon 2 mg once a week  Start repaglinide 1 mg with breakfast , lunch and dinner    Check sugars before meals and bedtime, can alternate timings    Date  Before BF    Before lunch    Before dinner   Bedtime      Call/ send sugars

## 2021-08-02 ENCOUNTER — TELEPHONE (OUTPATIENT)
Dept: ENDOCRINOLOGY CLINIC | Facility: CLINIC | Age: 81
End: 2021-08-02

## 2021-08-02 RX ORDER — INSULIN GLARGINE 100 [IU]/ML
45 INJECTION, SOLUTION SUBCUTANEOUS DAILY
Qty: 45 ML | Refills: 0 | Status: SHIPPED | OUTPATIENT
Start: 2021-08-02 | End: 2021-11-07

## 2021-08-02 NOTE — TELEPHONE ENCOUNTER
LOV: 7/14/21    Future Appointments   Date Time Provider Ebonie Howard   10/27/2021 10:45 AM Fang Dia MD [facility]     Confirmed with patient that she does not have anymore insulin and requesting to send it to [address] [place] [address]

## 2021-08-04 NOTE — TELEPHONE ENCOUNTER
Medication PA Requested:  insulin glargine (LANTUS SOLOSTAR) 100 UNIT/ML Subcutaneous Solution Pen-injector                                                        CoverMyMeds Used: Yes  Key: [id number]  Sig:Inject 45 Units into the skin daily.    DX Code:  P4

## 2021-08-04 NOTE — TELEPHONE ENCOUNTER
Medication PA Requested:  insulin glargine (LANTUS SOLOSTAR) 100 UNIT/ML Subcutaneous Solution Pen-injector                                                        CoverMyMeds Used:  Yes  Key: [id number]-not used   Key: [id number]     Sig:Inject 45 Units into th

## 2021-08-05 NOTE — TELEPHONE ENCOUNTER
Spoke to patient to confirm receipt of lantus RX - patient stated she has medication and is \"all set\" now

## 2021-08-10 ENCOUNTER — OFFICE VISIT (OUTPATIENT)
Dept: OPHTHALMOLOGY | Facility: CLINIC | Age: 81
End: 2021-08-10
Payer: MEDICARE

## 2021-08-10 DIAGNOSIS — H40.1132 PRIMARY OPEN ANGLE GLAUCOMA (POAG) OF BOTH EYES, MODERATE STAGE: Primary | ICD-10-CM

## 2021-08-10 PROCEDURE — 99213 OFFICE O/P EST LOW 20 MIN: CPT | Performed by: OPHTHALMOLOGY

## 2021-08-10 NOTE — PROGRESS NOTES
Harry Barron is a 80year old female. HPI:     HPI     Consult      Additional comments: Consult per Dr. Joel Cates              Comments     Patient is here for an IOP check. She is taking Latanoprost OU at bedtime as directed.   Patient states she does n Take 0.5 tablets (125 mg total) by mouth Q12H. 90 tablet 1   • Exenatide ER (BYDUREON BCISE) 2 MG/0.85ML Subcutaneous Auto-injector Inject 2 mg into the skin once a week.  10.2 mL 0   • Accu-Chek Softclix Lancets Does not apply Misc USE TO TEST BLOOD SUGAR and Fundus Exam     Slit Lamp Exam       Right Left    Lids/Lashes Dermatochalasis, Meibomian gland dysfunction Dermatochalasis, Meibomian gland dysfunction    Conjunctiva/Sclera Trace Injection, Ocular Melanosis Trace Injection, Ocular Melanosis    Cornea

## 2021-08-10 NOTE — ASSESSMENT & PLAN NOTE
IOP is stable. Continue Latanoprost at bedtime in both eyes. Patient can take Latanoprost in the morning if she continues to have \"crystals\" in her eyes. Will have patient back in 4 months for a pressure check.

## 2021-08-10 NOTE — PATIENT INSTRUCTIONS
Primary open angle glaucoma (POAG) of both eyes, moderate stage  IOP is stable. Continue Latanoprost at bedtime in both eyes. Patient can take Latanoprost in the morning if she continues to have \"crystals\" in her eyes.    Will have patient back in

## 2021-08-16 ENCOUNTER — TELEPHONE (OUTPATIENT)
Dept: FAMILY MEDICINE CLINIC | Facility: CLINIC | Age: 81
End: 2021-08-16

## 2021-08-17 RX ORDER — LISINOPRIL 10 MG/1
10 TABLET ORAL DAILY
Qty: 90 TABLET | Refills: 0 | Status: SHIPPED | OUTPATIENT
Start: 2021-08-17 | End: 2021-09-08

## 2021-08-17 NOTE — TELEPHONE ENCOUNTER
CSS, please contact patient and assist in scheduling follow up office visit for chronic conditions. MyChart message sent informing pt. 90-day refill sent with note stating appt needed for further refills, per department protocol.

## 2021-08-17 NOTE — TELEPHONE ENCOUNTER
Spoke, with the patient and informed her of the message below. Pt did schedule a follow up with Linda Beltrán on 9-8-21.

## 2021-08-17 NOTE — TELEPHONE ENCOUNTER
Refilled per [facility] protocol. 90 day refill given on 08/17/21, appointment needed for further refills. Requested Prescriptions   Pending Prescriptions Disp Refills    LISINOPRIL 10 MG Oral Tab [Pharmacy Med Name: LISINOPRIL 10 MG Tablet] 90 tablet 1     Sig: TAKE 1 TABLET EVERY DAY        Hypertensive Medications Protocol Failed - 8/16/2021  8:25 PM        Failed - Appointment in past 6 or next 3 months        Passed - CMP or BMP in past 12 months        Passed - GFR  > 50     Lab Results   Component Value Date    GFRAA 51 (L) 05/12/2021                   Recent Outpatient Visits              1 week ago Primary open angle glaucoma (POAG) of both eyes, moderate stage    [facility] Larissa Pritchard MD    Office Visit    1 month ago Type 2 diabetes mellitus with chronic kidney disease, with long-term current use of insulin, unspecified CKD stage [facility])    [facility], [place] [address], Barbara Thompson MD    Office Visit    2 months ago Dystonic tremor    [facility], [place] [address], Sg , [place], [place]    Office Visit    3 months ago Type 2 diabetes mellitus with chronic kidney disease, with long-term current use of insulin, unspecified CKD stage [facility])    [facility] Thompson [facility]    Office Visit    4 months ago Dystonic tremor    [facility], [place] [address], S[place] [address], [place], [place]    Office Visit          Future Appointments         Provider Department Appt Notes    In 2 months Hannah Thompson MD [facility], [place] [address], Barbara cox 3 month f/u    In 3 months Gurmeet Case, [facility], [place] [address], [facility] -   Follow up on tremors. In 4 months Larissa Pritchard MD [facility] EP.  4 mos IOP

## 2021-08-25 ENCOUNTER — TELEPHONE (OUTPATIENT)
Dept: ENDOCRINOLOGY CLINIC | Facility: CLINIC | Age: 81
End: 2021-08-25

## 2021-08-25 NOTE — TELEPHONE ENCOUNTER
Pharmacy refill request for:    Lisinopril 20mg Tablet    MESSAGE:  To avoid delays in processing this order, please provide strength, directions, quantity and refills for each drug listed. If no quantity is entered, we will dispense a 90 day supply unless

## 2021-08-31 NOTE — TELEPHONE ENCOUNTER
She is on 10 mg of Lisinopril. Has not been prescribed 20 mg in the past. Also, refilled this x 90 days on 8/17.

## 2021-09-01 NOTE — TELEPHONE ENCOUNTER
Spoke to [facility] and they don't show that lisinopril 20 mg on the patients profile

## 2021-09-08 ENCOUNTER — OFFICE VISIT (OUTPATIENT)
Dept: FAMILY MEDICINE CLINIC | Facility: CLINIC | Age: 81
End: 2021-09-08
Payer: MEDICARE

## 2021-09-08 VITALS
SYSTOLIC BLOOD PRESSURE: 138 MMHG | DIASTOLIC BLOOD PRESSURE: 72 MMHG | BODY MASS INDEX: 34.48 KG/M2 | TEMPERATURE: 98 F | WEIGHT: 187.38 LBS | HEART RATE: 82 BPM | HEIGHT: 62 IN

## 2021-09-08 DIAGNOSIS — M54.2 NECK PAIN: Primary | ICD-10-CM

## 2021-09-08 DIAGNOSIS — I10 ESSENTIAL HYPERTENSION: ICD-10-CM

## 2021-09-08 DIAGNOSIS — E78.5 HYPERLIPIDEMIA, UNSPECIFIED HYPERLIPIDEMIA TYPE: ICD-10-CM

## 2021-09-08 DIAGNOSIS — E11.9 DIABETES MELLITUS TYPE 2 WITHOUT RETINOPATHY (HCC): ICD-10-CM

## 2021-09-08 DIAGNOSIS — G25.2 DYSTONIC TREMOR: ICD-10-CM

## 2021-09-08 PROCEDURE — 3078F DIAST BP <80 MM HG: CPT | Performed by: FAMILY MEDICINE

## 2021-09-08 PROCEDURE — 3075F SYST BP GE 130 - 139MM HG: CPT | Performed by: FAMILY MEDICINE

## 2021-09-08 PROCEDURE — 99214 OFFICE O/P EST MOD 30 MIN: CPT | Performed by: FAMILY MEDICINE

## 2021-09-08 PROCEDURE — 3008F BODY MASS INDEX DOCD: CPT | Performed by: FAMILY MEDICINE

## 2021-09-08 RX ORDER — PRIMIDONE 250 MG/1
125 TABLET ORAL EVERY 12 HOURS
Qty: 90 TABLET | Refills: 1 | Status: SHIPPED | OUTPATIENT
Start: 2021-09-08 | End: 2021-11-15

## 2021-09-08 RX ORDER — LATANOPROST 50 UG/ML
1 SOLUTION/ DROPS OPHTHALMIC NIGHTLY
Qty: 7.5 ML | Refills: 0 | Status: SHIPPED | OUTPATIENT
Start: 2021-09-08 | End: 2021-12-16

## 2021-09-08 RX ORDER — ATORVASTATIN CALCIUM 80 MG/1
80 TABLET, FILM COATED ORAL DAILY
Qty: 90 TABLET | Refills: 1 | Status: SHIPPED | OUTPATIENT
Start: 2021-09-08

## 2021-09-08 RX ORDER — REPAGLINIDE 1 MG/1
1 TABLET ORAL
Qty: 270 TABLET | Refills: 0 | Status: SHIPPED | OUTPATIENT
Start: 2021-09-08 | End: 2021-12-22

## 2021-09-08 RX ORDER — CYCLOBENZAPRINE HCL 5 MG
5 TABLET ORAL NIGHTLY
Qty: 7 TABLET | Refills: 0 | Status: SHIPPED | OUTPATIENT
Start: 2021-09-08 | End: 2021-11-10

## 2021-09-08 RX ORDER — LISINOPRIL 10 MG/1
10 TABLET ORAL DAILY
Qty: 90 TABLET | Refills: 0 | Status: SHIPPED | OUTPATIENT
Start: 2021-09-08 | End: 2021-12-22

## 2021-09-08 RX ORDER — BLOOD SUGAR DIAGNOSTIC
STRIP MISCELLANEOUS
Qty: 100 STRIP | Refills: 0 | Status: SHIPPED | OUTPATIENT
Start: 2021-09-08 | End: 2021-10-13

## 2021-09-08 NOTE — PROGRESS NOTES
HPI:    Evangelist Aragon is a 80year old female presents to clinic for follow-up. For the past 4 days, patient has been experiencing neck pain. Denies known trauma or injury, thinks she might of slept awkwardly.   Pain is located on the left side of her Curlene Peppers Solution Place 1 drop into both eyes nightly. 7.5 mL 0   • lisinopril 10 MG Oral Tab Take 1 tablet (10 mg total) by mouth daily.  90 tablet 0   • primidone 250 MG Oral Tab Take 0.5 tablets (125 mg total) by mouth Q12H. 90 tablet 1   • Repaglinide 1 MG Oral tenderness on left side of neck, increases with flexion of neck, improves with extension. Patient unable to turn her head to the left side, can rotate to the right  Cardiovascular:      Rate and Rhythm: Normal rate and regular rhythm.       Heart sounds: N Repaglinide 1 MG Oral Tab 270 tablet 0     Sig: Take 1 tablet (1 mg total) by mouth 3 (three) times daily before meals. • atorvastatin 80 MG Oral Tab 90 tablet 1     Sig: Take 1 tablet (80 mg total) by mouth daily.    • Glucose Blood (ACCU-CHEK ARI PLUS

## 2021-09-11 ENCOUNTER — TELEPHONE (OUTPATIENT)
Dept: FAMILY MEDICINE CLINIC | Facility: CLINIC | Age: 81
End: 2021-09-11

## 2021-09-11 NOTE — TELEPHONE ENCOUNTER
I reviewed the patient’s chart. Cannot put her on Medrol steroid pack in lieu of her diabetes. If she has not done so, please advise her to take the equivalent of 400 mg of ibuprofen up to 3 times daily with meals.  This will help until she can be seen next Dr Jovel

## 2021-09-11 NOTE — TELEPHONE ENCOUNTER
Verified pt name and . Reviewed doctor's recommendations as noted below and advised pt to call back next week if no improvement in symptoms. Pt agreed with plan of care.

## 2021-09-11 NOTE — TELEPHONE ENCOUNTER
Patient calling with update :  reports LOV 9/8 for \" pain to her neck'    States cannot turn head side to side, difficulty to tuck chin to chest, is able to tilt her head back,  not improved  after taking Cycobenazaprine 5mg  for the past 2 nights, states

## 2021-10-13 RX ORDER — BLOOD SUGAR DIAGNOSTIC
STRIP MISCELLANEOUS
Qty: 100 STRIP | Refills: 0 | Status: SHIPPED | OUTPATIENT
Start: 2021-10-13 | End: 2021-10-27

## 2021-10-13 NOTE — TELEPHONE ENCOUNTER
Refill passed per [address] protocol. Requested Prescriptions   Pending Prescriptions Disp Refills    ACCU-CHEK ARI PLUS In Vitro Strip [Pharmacy Med Name: Lorne Ra   Strip] 100 strip 0     Sig: Use test strip to test blood sugar twice daily        Diabetic Supplies Protocol Passed - 10/12/2021  9:26 PM        Passed - Appointment in past 12 or next 3 months              Recent Outpatient Visits              1 month ago Neck pain    [address] [facility] [employer] [facility] Stew Hawkins MD    Office Visit    2 months ago Primary open angle glaucoma (POAG) of both eyes, moderate stage    [facility] Amy Fofana MD    Office Visit    3 months ago Type 2 diabetes mellitus with chronic kidney disease, with long-term current use of insulin, unspecified CKD stage [facility])    [facility], [place] [address], [facility] Angle Gallardo MD    Office Visit    4 months ago Dystonic tremor    [facility], [place] [address], [address]    Office Visit    5 months ago Type 2 diabetes mellitus with chronic kidney disease, with long-term current use of insulin, unspecified CKD stage [facility])    [facility], [place] [address], [facility] Angle Gallardo MD    Office Visit            Future Appointments         Provider Department Appt Notes    In 2 weeks Angle Gallardo MD [facility], [place] [address], [facility] [employer] 3 month f/u    In 1 month Elías Ashford, [facility], [address] and -   Follow up on tremors. In 2 months Amy Fofana MD [facility] EP.  4 mos IOP     In 4 months Stew Hawkins, [address], [facility], [facility] 6 month

## 2021-10-27 ENCOUNTER — OFFICE VISIT (OUTPATIENT)
Dept: ENDOCRINOLOGY CLINIC | Facility: CLINIC | Age: 81
End: 2021-10-27
Payer: MEDICARE

## 2021-10-27 VITALS
DIASTOLIC BLOOD PRESSURE: 76 MMHG | WEIGHT: 183 LBS | HEART RATE: 85 BPM | SYSTOLIC BLOOD PRESSURE: 147 MMHG | BODY MASS INDEX: 33 KG/M2

## 2021-10-27 DIAGNOSIS — E11.22 TYPE 2 DIABETES MELLITUS WITH CHRONIC KIDNEY DISEASE, WITH LONG-TERM CURRENT USE OF INSULIN, UNSPECIFIED CKD STAGE (HCC): Primary | ICD-10-CM

## 2021-10-27 DIAGNOSIS — Z79.4 TYPE 2 DIABETES MELLITUS WITH CHRONIC KIDNEY DISEASE, WITH LONG-TERM CURRENT USE OF INSULIN, UNSPECIFIED CKD STAGE (HCC): Primary | ICD-10-CM

## 2021-10-27 DIAGNOSIS — E78.5 DYSLIPIDEMIA: ICD-10-CM

## 2021-10-27 PROCEDURE — 82947 ASSAY GLUCOSE BLOOD QUANT: CPT | Performed by: INTERNAL MEDICINE

## 2021-10-27 PROCEDURE — 99214 OFFICE O/P EST MOD 30 MIN: CPT | Performed by: INTERNAL MEDICINE

## 2021-10-27 PROCEDURE — 3077F SYST BP >= 140 MM HG: CPT | Performed by: INTERNAL MEDICINE

## 2021-10-27 PROCEDURE — 3078F DIAST BP <80 MM HG: CPT | Performed by: INTERNAL MEDICINE

## 2021-10-27 PROCEDURE — 83036 HEMOGLOBIN GLYCOSYLATED A1C: CPT | Performed by: INTERNAL MEDICINE

## 2021-10-27 PROCEDURE — 36416 COLLJ CAPILLARY BLOOD SPEC: CPT | Performed by: INTERNAL MEDICINE

## 2021-10-27 RX ORDER — DULAGLUTIDE 0.75 MG/.5ML
0.75 INJECTION, SOLUTION SUBCUTANEOUS WEEKLY
Qty: 6 ML | Refills: 0 | Status: SHIPPED | OUTPATIENT
Start: 2021-10-27 | End: 2021-10-27

## 2021-10-27 RX ORDER — BLOOD SUGAR DIAGNOSTIC
STRIP MISCELLANEOUS
Qty: 400 STRIP | Refills: 0 | Status: SHIPPED | OUTPATIENT
Start: 2021-10-27 | End: 2021-12-12

## 2021-10-27 NOTE — PROGRESS NOTES
Return Office Visit     CHIEF COMPLAINT:    DM with CKD  Dyslipidemia    HISTORY OF PRESENT ILLNESS:  Leann Dooley is a 80year old female who presents for follow up for DM.      DM HISTORY  Diagnosed: 28  States she was diagnosed with Type 2 DM 10/27/2021) 7 tablet 0   • Accu-Chek Softclix Lancets Does not apply Misc USE TO TEST BLOOD SUGAR TWICE A  each 0   • Blood Glucose Monitoring Suppl (ACCU-CHEK ARI) Does not apply Device Use to check blood sugars two times per day 1 Device 0   • B distress  Head: Atraumatic  Eyes:  normal conjunctivae, sclera. , normal sclera and normal pupils  Throat/Neck: normal sound to voice. Normal hearing, normal speech  Respiratory:  Speaking in full sentences, non-labored.  no increased work of breathing, no a recognition and management discussed     2. Patient’s BP has been okay  Low salt diet. FU with PCP  3.  Dyslipidemia  A) Discussed age appropriate lifestyle modifications including reductions in dietary total and saturated fat, weight loss, aerobic exercise

## 2021-11-02 ENCOUNTER — TELEPHONE (OUTPATIENT)
Dept: ENDOCRINOLOGY CLINIC | Facility: CLINIC | Age: 81
End: 2021-11-02

## 2021-11-02 NOTE — TELEPHONE ENCOUNTER
Recent BG better, however, noted that her BG were low one day  Has she had any low Bg since then ?    Please let me know  Please call is BG is under 80

## 2021-11-02 NOTE — TELEPHONE ENCOUNTER
Pt called in to give the blood sugar level numbers 184, 98, 2021, 227 next day 171/116,228,242, next day 162,229,280,220, 185,95,138,171, next day 574,91,96,43. Pt did not have the dates.  Please follow up

## 2021-11-02 NOTE — TELEPHONE ENCOUNTER
Dr. Ana Lilia Redmond,  BG readings:  11/1: 184, 98, 2021, 227  10/31:  171/116,228,242,   10/30: 162,229,280,220,   10/29: 216,37,269,062,   10/28:  261,75,35,38.     DM regimen per LOV 10/27/21:  Lantus 40 units daily  Repaglinide 1mg with Breakfast, lunch and di

## 2021-11-03 ENCOUNTER — HOSPITAL ENCOUNTER (EMERGENCY)
Facility: HOSPITAL | Age: 81
Discharge: HOME | End: 2021-11-03
Attending: EMERGENCY MEDICINE
Payer: MEDICARE

## 2021-11-03 ENCOUNTER — APPOINTMENT (OUTPATIENT)
Dept: GENERAL RADIOLOGY | Facility: HOSPITAL | Age: 81
End: 2021-11-03
Attending: EMERGENCY MEDICINE
Payer: MEDICARE

## 2021-11-03 ENCOUNTER — TELEPHONE (OUTPATIENT)
Dept: FAMILY MEDICINE CLINIC | Facility: CLINIC | Age: 81
End: 2021-11-03

## 2021-11-03 VITALS
SYSTOLIC BLOOD PRESSURE: 173 MMHG | DIASTOLIC BLOOD PRESSURE: 96 MMHG | TEMPERATURE: 98 F | HEART RATE: 89 BPM | OXYGEN SATURATION: 98 % | RESPIRATION RATE: 18 BRPM

## 2021-11-03 DIAGNOSIS — S39.012A STRAIN OF LUMBAR REGION, INITIAL ENCOUNTER: Primary | ICD-10-CM

## 2021-11-03 DIAGNOSIS — M16.10 HIP ARTHRITIS: ICD-10-CM

## 2021-11-03 PROCEDURE — 96372 THER/PROPH/DIAG INJ SC/IM: CPT

## 2021-11-03 PROCEDURE — 73502 X-RAY EXAM HIP UNI 2-3 VIEWS: CPT | Performed by: EMERGENCY MEDICINE

## 2021-11-03 PROCEDURE — 99283 EMERGENCY DEPT VISIT LOW MDM: CPT

## 2021-11-03 RX ORDER — TRAMADOL HYDROCHLORIDE 50 MG/1
TABLET ORAL EVERY 6 HOURS PRN
Qty: 10 TABLET | Refills: 0 | Status: SHIPPED | OUTPATIENT
Start: 2021-11-03 | End: 2021-11-10

## 2021-11-03 RX ORDER — TRAMADOL HYDROCHLORIDE 50 MG/1
50 TABLET ORAL ONCE
Status: COMPLETED | OUTPATIENT
Start: 2021-11-03 | End: 2021-11-03

## 2021-11-03 RX ORDER — KETOROLAC TROMETHAMINE 30 MG/ML
30 INJECTION, SOLUTION INTRAMUSCULAR; INTRAVENOUS ONCE
Status: COMPLETED | OUTPATIENT
Start: 2021-11-03 | End: 2021-11-03

## 2021-11-03 NOTE — TELEPHONE ENCOUNTER
Please call patient--PenPatht message sent      ----- Message from Teresa Doshi sent at 11/3/2021  6:24 AM CDT -----  Regarding: Pain is traveling  Gm     This is Derrick Davis I’m emailing regarding my Mom.  She has been in for a piercing numbing pain she is

## 2021-11-03 NOTE — ED PROVIDER NOTES
Patient Seen in: [facility] AND [facility] Emergency Department    History   Patient presents with:  Pain      HPI    59-year-old female presents the ER with complaint of left hip and low back pain. Patient with past medical history arthritis of the hips.   Inocencio positives for the review of systems are mentioned in the HPI  All other organ systems are reviewed and are negative. Constitutional and vital signs reviewed.       Social History and Family History elements reviewed from today, pertinent positives to the Deep Tendon Reflexes: Reflexes are normal and symmetric. Psychiatric:         Behavior: Behavior normal.         Thought Content:  Thought content normal.         Judgment: Judgment normal.         ED Course      Labs Reviewed - No data to display Impression:  Strain of lumbar region, initial encounter  (primary encounter diagnosis)  Hip arthritis    Disposition:  Discharge    Follow-up:  MD Christiana Bloom 59 [phone]    Schedule an appointment as soon as

## 2021-11-03 NOTE — TELEPHONE ENCOUNTER
Condition upon leaving the department: Stable     Disposition and Plan      Clinical Impression:  Strain of lumbar region, initial encounter  (primary encounter diagnosis)  Hip arthritis     Disposition:  Discharge     Follow-up:  Yonathan Mancini MD  [id number]

## 2021-11-03 NOTE — TELEPHONE ENCOUNTER
Called Pt and daughter. Daughter answered call. Dr. Lucy GALINDO, per daughter, they just arrived in [address] [place] [address] ER due to Pt experiencing a lot of pain.

## 2021-11-04 NOTE — TELEPHONE ENCOUNTER
rn called patient , she has not had any other low numbers since the last time.  she ate to make sure she would not drop lower before going to bed that day.     Now co extreme hip pain , was in ER yesterday, and was given med but not helpi

## 2021-11-05 ENCOUNTER — TELEPHONE (OUTPATIENT)
Dept: FAMILY MEDICINE CLINIC | Facility: CLINIC | Age: 81
End: 2021-11-05

## 2021-11-05 NOTE — TELEPHONE ENCOUNTER
Patient's daughter, Selene Sanchez, is calling regarding patient's well being, daughter states after the ER visit she is not sure if patient is following PCP's instructions.    Also daughter would like to talk to PCP before patient's scheduled appt on 11/10/21 as she

## 2021-11-06 ENCOUNTER — TELEPHONE (OUTPATIENT)
Dept: ENDOCRINOLOGY CLINIC | Facility: CLINIC | Age: 81
End: 2021-11-06

## 2021-11-06 NOTE — TELEPHONE ENCOUNTER
Daughter reports patient has been complaining of arthritis pain, at times patient will not move due to the severity of the pain. Patient had reported that Dr Ron Diaz said to take 3 ibuprofen daily with each meal and daughter felt this was excessive.  This info i

## 2021-11-07 RX ORDER — INSULIN GLARGINE 100 [IU]/ML
40 INJECTION, SOLUTION SUBCUTANEOUS DAILY
Qty: 36 ML | Refills: 0 | Status: SHIPPED | OUTPATIENT
Start: 2021-11-07

## 2021-11-10 ENCOUNTER — OFFICE VISIT (OUTPATIENT)
Dept: FAMILY MEDICINE CLINIC | Facility: CLINIC | Age: 81
End: 2021-11-10
Payer: MEDICARE

## 2021-11-10 VITALS
HEART RATE: 89 BPM | TEMPERATURE: 98 F | SYSTOLIC BLOOD PRESSURE: 129 MMHG | HEIGHT: 62 IN | DIASTOLIC BLOOD PRESSURE: 66 MMHG | WEIGHT: 180.38 LBS | BODY MASS INDEX: 33.19 KG/M2

## 2021-11-10 DIAGNOSIS — M25.561 CHRONIC PAIN OF BOTH KNEES: ICD-10-CM

## 2021-11-10 DIAGNOSIS — M25.562 CHRONIC PAIN OF BOTH KNEES: ICD-10-CM

## 2021-11-10 DIAGNOSIS — Z23 FLU VACCINE NEED: ICD-10-CM

## 2021-11-10 DIAGNOSIS — M25.552 BILATERAL HIP PAIN: Primary | ICD-10-CM

## 2021-11-10 DIAGNOSIS — J30.89 SEASONAL ALLERGIC RHINITIS DUE TO OTHER ALLERGIC TRIGGER: ICD-10-CM

## 2021-11-10 DIAGNOSIS — G89.29 CHRONIC PAIN OF BOTH KNEES: ICD-10-CM

## 2021-11-10 DIAGNOSIS — M25.551 BILATERAL HIP PAIN: Primary | ICD-10-CM

## 2021-11-10 PROCEDURE — 99214 OFFICE O/P EST MOD 30 MIN: CPT | Performed by: FAMILY MEDICINE

## 2021-11-10 PROCEDURE — G0008 ADMIN INFLUENZA VIRUS VAC: HCPCS | Performed by: FAMILY MEDICINE

## 2021-11-10 PROCEDURE — 3078F DIAST BP <80 MM HG: CPT | Performed by: FAMILY MEDICINE

## 2021-11-10 PROCEDURE — 3008F BODY MASS INDEX DOCD: CPT | Performed by: FAMILY MEDICINE

## 2021-11-10 PROCEDURE — 3074F SYST BP LT 130 MM HG: CPT | Performed by: FAMILY MEDICINE

## 2021-11-10 PROCEDURE — 90662 IIV NO PRSV INCREASED AG IM: CPT | Performed by: FAMILY MEDICINE

## 2021-11-10 RX ORDER — DULAGLUTIDE 0.75 MG/.5ML
INJECTION, SOLUTION SUBCUTANEOUS
COMMUNITY
Start: 2021-10-27 | End: 2021-11-15

## 2021-11-10 RX ORDER — FLUTICASONE PROPIONATE 50 MCG
1 SPRAY, SUSPENSION (ML) NASAL DAILY
Qty: 1 EACH | Refills: 0 | Status: SHIPPED | OUTPATIENT
Start: 2021-11-10 | End: 2021-11-15

## 2021-11-10 RX ORDER — FLUTICASONE PROPIONATE 50 MCG
SPRAY, SUSPENSION (ML) NASAL DAILY
Refills: 0 | Status: CANCELLED | OUTPATIENT
Start: 2021-11-10

## 2021-11-10 RX ORDER — CYCLOBENZAPRINE HCL 5 MG
5 TABLET ORAL NIGHTLY
Qty: 7 TABLET | Refills: 0 | Status: SHIPPED | OUTPATIENT
Start: 2021-11-10 | End: 2021-12-22

## 2021-11-10 NOTE — PROGRESS NOTES
HPI:    Javi Navas is a 80year old female presents to clinic for ER follow-up. Has chronic bilateral hip pain and bilateral knee pain. Feels that either her hips or her knees are constantly in pain.   Believes she has arthritis in her hips-had an x-ra tablet 0   • fluticasone propionate 50 MCG/ACT Nasal Suspension 1 spray by Each Nare route daily. 1 each 0   • insulin glargine (LANTUS SOLOSTAR) 100 UNIT/ML Subcutaneous Solution Pen-injector Inject 40 Units into the skin daily.  36 mL 0   • Glucose Blood Pulse: 89   Temp: 97.5 °F (36.4 °C)   TempSrc: Temporal   Weight: 180 lb 6 oz (81.8 kg)   Height: 5' 2\" (1.575 m)     Physical Exam  Vitals reviewed. Constitutional:       General: She is not in acute distress.   Cardiovascular:      Rate and Rhythm: N Nare route daily. Imaging & Referrals:  FLU VACC HIGH DOSE PRSV FREE  INFLUENZA VACCINE, QUAD, PRESERVATIVE FREE, 0.5 ML  PHYSICAL THERAPY EXTERNAL       This note was created by [employer] voice recognition.  Errors in content may be related to improper

## 2021-11-11 ENCOUNTER — TELEPHONE (OUTPATIENT)
Dept: CASE MANAGEMENT | Age: 81
End: 2021-11-11

## 2021-11-11 DIAGNOSIS — M25.561 CHRONIC PAIN OF BOTH KNEES: ICD-10-CM

## 2021-11-11 DIAGNOSIS — M25.552 BILATERAL HIP PAIN: Primary | ICD-10-CM

## 2021-11-11 DIAGNOSIS — M25.551 BILATERAL HIP PAIN: Primary | ICD-10-CM

## 2021-11-11 DIAGNOSIS — M25.562 CHRONIC PAIN OF BOTH KNEES: ICD-10-CM

## 2021-11-11 DIAGNOSIS — G89.29 CHRONIC PAIN OF BOTH KNEES: ICD-10-CM

## 2021-11-11 NOTE — TELEPHONE ENCOUNTER
SHANI Rice,    I am working on the referral you submitted for Nancy for PT. The facility she choose is not in network with her insurance. Please redirect to [place], or she can call her insurance to find an in network facility.     Thank you  Martina Mittal

## 2021-11-15 ENCOUNTER — OFFICE VISIT (OUTPATIENT)
Dept: NEUROLOGY | Facility: CLINIC | Age: 81
End: 2021-11-15
Payer: MEDICARE

## 2021-11-15 VITALS
HEIGHT: 62 IN | WEIGHT: 180 LBS | SYSTOLIC BLOOD PRESSURE: 132 MMHG | BODY MASS INDEX: 33.13 KG/M2 | DIASTOLIC BLOOD PRESSURE: 66 MMHG | HEART RATE: 70 BPM

## 2021-11-15 DIAGNOSIS — G25.2 DYSTONIC TREMOR: Primary | ICD-10-CM

## 2021-11-15 PROBLEM — N18.30 CHRONIC RENAL DISEASE, STAGE III (HCC): Status: ACTIVE | Noted: 2021-11-15

## 2021-11-15 PROCEDURE — 3078F DIAST BP <80 MM HG: CPT | Performed by: OTHER

## 2021-11-15 PROCEDURE — 99214 OFFICE O/P EST MOD 30 MIN: CPT | Performed by: OTHER

## 2021-11-15 PROCEDURE — 3008F BODY MASS INDEX DOCD: CPT | Performed by: OTHER

## 2021-11-15 PROCEDURE — 3075F SYST BP GE 130 - 139MM HG: CPT | Performed by: OTHER

## 2021-11-15 RX ORDER — PRIMIDONE 250 MG/1
125 TABLET ORAL EVERY 12 HOURS
Qty: 90 TABLET | Refills: 2 | Status: SHIPPED | OUTPATIENT
Start: 2022-03-08 | End: 2022-12-03

## 2021-11-15 NOTE — PROGRESS NOTES
Ariadne Dub 37  [facility], [address] [place] [address]  [phone]        Neurology Clinic Follow Up Note    Chief Complaint:  Tremors (LOV: 6/7/21. Pt presents for follow up on tremors.  Last visit- patient was increased to P short-term memory deficits consistent with a an amnestic profile of MCI. At the end of the April 2021 encounter my suspicion the patient had Parkinson disease was much lower. 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 Started on primidone. pain localized just to her right thigh, worse with standing; improves w/ rest. Does not occur when walking.     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 , Disp: , Rfl:   •  DROPLET PEN NEEDLES 32G X 4 MM Does not apply Misc, Use as directed, Disp: 100 each, Rfl: 1      Reviewed and assessed      Objective: None   R     L  Hip flexors 4+    5      Knee Extensors 5    5    Foot Taps: Foot taps are symmetric. Asterixis: No asterixis noted. Tremor:  no rest tremor. She has no reemergent tremmor.  She as significant postural tremor in her left  > right arms; current dose. Plan   1. Dystonic tremor    Increase dose of primidone to 125 mg twice a day. Monitor for adverse effects. 2. Neuropathic pain of thigh, right    - not going to Physical therapy     3.  Transient right leg weakness    - Not going t

## 2021-12-12 RX ORDER — BLOOD SUGAR DIAGNOSTIC
STRIP MISCELLANEOUS
Qty: 100 STRIP | Refills: 0 | Status: SHIPPED | OUTPATIENT
Start: 2021-12-12 | End: 2022-01-17

## 2021-12-16 ENCOUNTER — OFFICE VISIT (OUTPATIENT)
Dept: OPHTHALMOLOGY | Facility: CLINIC | Age: 81
End: 2021-12-16
Payer: MEDICARE

## 2021-12-16 DIAGNOSIS — H02.883 MEIBOMIAN GLAND DYSFUNCTION (MGD) OF BOTH EYES: ICD-10-CM

## 2021-12-16 DIAGNOSIS — H02.886 MEIBOMIAN GLAND DYSFUNCTION (MGD) OF BOTH EYES: ICD-10-CM

## 2021-12-16 DIAGNOSIS — H40.1132 PRIMARY OPEN ANGLE GLAUCOMA (POAG) OF BOTH EYES, MODERATE STAGE: Primary | ICD-10-CM

## 2021-12-16 PROCEDURE — 99213 OFFICE O/P EST LOW 20 MIN: CPT | Performed by: OPHTHALMOLOGY

## 2021-12-16 RX ORDER — TIMOLOL MALEATE 5 MG/ML
SOLUTION/ DROPS OPHTHALMIC
Qty: 15 ML | Refills: 3 | Status: SHIPPED | OUTPATIENT
Start: 2021-12-16

## 2021-12-16 NOTE — PATIENT INSTRUCTIONS
Primary open angle glaucoma (POAG) of both eyes, moderate stage  IOP is stable, but she has not taken Latanoprost for a few weeks due to eye irritation.    Discussed with patient that she can stay off of the drops, but that if she does not use drops it can

## 2021-12-16 NOTE — ASSESSMENT & PLAN NOTE
IOP is stable, but she has not taken Latanoprost for a few weeks due to eye irritation. Discussed with patient that she can stay off of the drops, but that if she does not use drops it can lead to vision loss and possible blindness.   Options:  1.) change

## 2021-12-16 NOTE — PROGRESS NOTES
Teresa Doshi is a 80year old female. HPI:     HPI     Pt is here for an IOP check. Pt should be using Latanoprost both eyes at bedtime but she stopped using drops a few weeks ago.  Pt complains of dry eyes both eyes, she states she cannot cry or produce Outpatient Medications   Medication Sig Dispense Refill   • timolol 0.5 % Ophthalmic Solution Use 1 drop 2 times a day in both eyes 15 mL 3   • Glucose Blood (ACCU-CHEK ARI PLUS) In Vitro Strip USE TEST STRIP TO TEST BLOOD SUGAR TWICE DAILY 100 strip 0 Pupils    Right PERRL    Left PERRL            Slit Lamp and Fundus Exam     Slit Lamp Exam       Right Left    Lids/Lashes Dermatochalasis, Meibomian gland dysfunction Dermatochalasis, Meibomian gland dysfunction    Conjunctiva/Sclera Trace Injection, Ocu on both eyelids for 5 minutes every morning and every night. After 5 minutes of holding the warm compresses on the eyelids, patient should gently rub the eyelashes and then rinse thoroughly with warm water. Diagnosis discussed with patient.   Use artific

## 2021-12-20 ENCOUNTER — TELEPHONE (OUTPATIENT)
Dept: FAMILY MEDICINE CLINIC | Facility: CLINIC | Age: 81
End: 2021-12-20

## 2021-12-20 ENCOUNTER — PATIENT MESSAGE (OUTPATIENT)
Dept: FAMILY MEDICINE CLINIC | Facility: CLINIC | Age: 81
End: 2021-12-20

## 2021-12-20 NOTE — TELEPHONE ENCOUNTER
From: Shane Monroe  To: Sandi Mccarty MD  Sent: 12/20/2021 9:34 AM CST  Subject: Nancy’ hip    Gm Dr  My Mom hip has gone out again she’s back using the walker in excruciating pain.  Pls advise

## 2021-12-20 NOTE — TELEPHONE ENCOUNTER
----- Message from Tarun Duarte sent at 12/20/2021  9:34 AM CST -----  Regarding: Nancy’ hip  Ok Burrows  My Mom hip has gone out again she’s back using the walker in excruciating pain.  Pls advise

## 2021-12-21 NOTE — TELEPHONE ENCOUNTER
Patient stated that she has been having left hip pain. No back pain. No swelling or bruising. No other symptoms. Pain was worse yesterday. Has been taking ibuprofen and icing the hip which has helped alittle. Pain is better today, not as bad.  Muscle relaxe

## 2021-12-22 ENCOUNTER — OFFICE VISIT (OUTPATIENT)
Dept: FAMILY MEDICINE CLINIC | Facility: CLINIC | Age: 81
End: 2021-12-22
Payer: MEDICARE

## 2021-12-22 VITALS
SYSTOLIC BLOOD PRESSURE: 132 MMHG | HEART RATE: 78 BPM | WEIGHT: 177 LBS | HEIGHT: 62 IN | DIASTOLIC BLOOD PRESSURE: 72 MMHG | TEMPERATURE: 98 F | BODY MASS INDEX: 32.57 KG/M2

## 2021-12-22 DIAGNOSIS — H01.00B BLEPHARITIS OF UPPER AND LOWER EYELIDS OF BOTH EYES, UNSPECIFIED TYPE: ICD-10-CM

## 2021-12-22 DIAGNOSIS — I10 ESSENTIAL HYPERTENSION: ICD-10-CM

## 2021-12-22 DIAGNOSIS — H01.00A BLEPHARITIS OF UPPER AND LOWER EYELIDS OF BOTH EYES, UNSPECIFIED TYPE: ICD-10-CM

## 2021-12-22 DIAGNOSIS — M25.552 LEFT HIP PAIN: Primary | ICD-10-CM

## 2021-12-22 PROCEDURE — 3078F DIAST BP <80 MM HG: CPT | Performed by: FAMILY MEDICINE

## 2021-12-22 PROCEDURE — 99214 OFFICE O/P EST MOD 30 MIN: CPT | Performed by: FAMILY MEDICINE

## 2021-12-22 PROCEDURE — 3008F BODY MASS INDEX DOCD: CPT | Performed by: FAMILY MEDICINE

## 2021-12-22 PROCEDURE — 3075F SYST BP GE 130 - 139MM HG: CPT | Performed by: FAMILY MEDICINE

## 2021-12-22 RX ORDER — REPAGLINIDE 1 MG/1
TABLET ORAL
Qty: 270 TABLET | Refills: 0 | Status: SHIPPED | OUTPATIENT
Start: 2021-12-22

## 2021-12-22 RX ORDER — LISINOPRIL 10 MG/1
10 TABLET ORAL DAILY
Qty: 90 TABLET | Refills: 1 | Status: SHIPPED | OUTPATIENT
Start: 2021-12-22 | End: 2022-05-20

## 2021-12-22 NOTE — TELEPHONE ENCOUNTER
[facility]   Requested Prescriptions   Pending Prescriptions Disp Refills    LISINOPRIL 10 MG Oral Tab [Pharmacy Med Name: LISINOPRIL 10 MG Tablet] 90 tablet 0     Sig: TAKE 1 TABLET EVERY DAY        Hypertensive Medications Protocol Passed - 12/21/2021  7:51 PM        Passed - CMP or BMP in past 12 months        Passed - Appointment in past 6 or next 3 months        Passed - GFR  > 50     Lab Results   Component Value Date    GFRAA 46 (L) 05/12/2021                     Future Appointments         Provider Department Appt Notes    In 2 months Jose Yeager MD [facility], [address] 6 month    In 2 months Daniel Rebolledo MD [facility], [address] 3 month f/u    In 4 months Chinedu Naidu MD [facility] Ophthalmology EP/ VF, OCT and DM EE          Recent Outpatient Visits              6 days ago Primary open angle glaucoma (POAG) of both eyes, moderate stage    [facility] Ophthalmology Chinedu Naidu MD    Office Visit    1 month ago Dystonic tremor    [facility], [address], [place], [place]    Office Visit    1 month ago Bilateral hip pain    [facility], [address] Jose Yeager MD    Office Visit    1 month ago Type 2 diabetes mellitus with chronic kidney disease, with long-term current use of insulin, unspecified CKD stage [facility])    Julee Fraga MD    Office Visit    3 months ago Neck pain    [facility], [address] Jose Yeager MD    Office Visit

## 2021-12-22 NOTE — PROGRESS NOTES
HPI:    Lasha Laws is a 80year old female presents to clinic with multiple concerns per  Experiencing left-sided hip pain. Was seen in November for this, given physical therapy referral.  Has not start but states that she is not much pain to attend. Glucose Blood (ACCU-CHEK ARI PLUS) In Vitro Strip USE TEST STRIP TO TEST BLOOD SUGAR TWICE DAILY 100 strip 0   • [START ON 3/8/2022] primidone 250 MG Oral Tab Take 0.5 tablets (125 mg total) by mouth Q12H. 90 tablet 2   • insulin glargine (LANTUS SOLOSTA sounds: Normal heart sounds. Pulmonary:      Effort: Pulmonary effort is normal. No respiratory distress. Breath sounds: Normal breath sounds. No stridor. No wheezing or rhonchi.    Skin:     Comments: Ambulating with walker   Neurological:      Gene

## 2022-01-11 RX ORDER — LATANOPROST 50 UG/ML
SOLUTION/ DROPS OPHTHALMIC
Qty: 3 EACH | Refills: 0 | Status: SHIPPED | OUTPATIENT
Start: 2022-01-11

## 2022-01-17 RX ORDER — BLOOD SUGAR DIAGNOSTIC
STRIP MISCELLANEOUS
Qty: 200 STRIP | Refills: 5 | Status: SHIPPED | OUTPATIENT
Start: 2022-01-17

## 2022-01-17 RX ORDER — PEN NEEDLE, DIABETIC 32GX 5/32"
NEEDLE, DISPOSABLE MISCELLANEOUS
Qty: 200 EACH | Refills: 5 | Status: SHIPPED | OUTPATIENT
Start: 2022-01-17 | End: 2022-01-19

## 2022-01-17 NOTE — TELEPHONE ENCOUNTER
Refill passed per [facility] protocol   Requested Prescriptions   Pending Prescriptions Disp Refills    DROPLET PEN NEEDLES 32G X 4 MM Does not apply Misc [Pharmacy Med Name: Droplet Pen Needles [id number] Mis Ht]  0     Sig: Use as directed        Mandie

## 2022-01-17 NOTE — TELEPHONE ENCOUNTER
Refill passed per [facility] protocol   Requested Prescriptions   Pending Prescriptions Disp Refills    ACCU-CHEK ARI PLUS In Vitro Strip [Pharmacy Med Name: ACCU-CHEK ARI PLUS   Strip] 100 strip 0     Sig: USE TEST STRIP TO TEST BLOOD SUGAR TWICE

## 2022-01-19 RX ORDER — PEN NEEDLE, DIABETIC 32GX 5/32"
NEEDLE, DISPOSABLE MISCELLANEOUS
Qty: 90 EACH | Refills: 3 | Status: SHIPPED | OUTPATIENT
Start: 2022-01-19

## 2022-01-19 NOTE — TELEPHONE ENCOUNTER
Refill passed per [facility] Protocol    Requested Prescriptions   Pending Prescriptions Disp Refills    DROPLET PEN NEEDLES 32G X 4 MM Does not apply Misc [Pharmacy Med Name: Droplet Pen Needles [id number] Mis Htls]  0     Sig: Use as directed        Diabetic Supplies Protocol Passed - 1/19/2022 11:04 AM        Passed - Appointment in past 12 or next 3 months              Recent Outpatient Visits              4 weeks ago [facility] hip pain    [facility]    Office Visit    1 month ago Primary open angle glaucoma (POAG) of both eyes, moderate stage    [facility] Ophthalmology Margarita Oliver MD    Office Visit    2 months ago Dystonic tremor    [facility], [address] , [address], [place], [place]    Office Visit    2 months ago Bilateral hip pain    [facility], [address] [place], [place], Adilia Smith MD    Office Visit    2 months ago Type 2 diabetes mellitus with chronic kidney disease, with long-term current use of insulin, unspecified CKD stage [facility])    [facility], Barbara Snyder MD    Office Visit            Future Appointments         Provider Department Appt Notes    In 5 days Anuj Rivers MD [facility], [address] left hip pain    In 1 month Scott Multani MD [facility], [address] [place] [address], [facility] 6 month  **DUE FOR MEDICARE AHA**    In 1 month Nigel Landeros MD [place] [facility], [address] 3 month f/u    In 3 months Margarita Oliver MD [facility] Ophthalmology EP/ VF, OCT and DM EE

## 2022-01-24 ENCOUNTER — OFFICE VISIT (OUTPATIENT)
Dept: ORTHOPEDICS CLINIC | Facility: CLINIC | Age: 82
End: 2022-01-24
Payer: MEDICARE

## 2022-01-24 DIAGNOSIS — M70.62 PERI-TROCHANTERIC LEFT HIP TENDINITIS: Primary | ICD-10-CM

## 2022-01-24 PROCEDURE — 99203 OFFICE O/P NEW LOW 30 MIN: CPT | Performed by: ORTHOPAEDIC SURGERY

## 2022-01-24 NOTE — H&P
NURSING INTAKE COMMENTS: Patient presents with:  Hip Pain: Left hip onset about 3 months ago, no injury. 3/10 pain at this time. Xray done on 11/3/21/      HPI: This [de-identified]year old female presents today for left hip pain.   Patient describes about 3 months, in Solution Use 1 drop 2 times a day in both eyes 15 mL 3   • [START ON 3/8/2022] primidone 250 MG Oral Tab Take 0.5 tablets (125 mg total) by mouth Q12H. 90 tablet 2   • insulin glargine (LANTUS SOLOSTAR) 100 UNIT/ML Subcutaneous Solution Pen-injector Inject chest pain  GI: denies emesis, no diarrhea  :  denies dysuria or difficulty urinating, chronic renal insufficiency  MUSCULOSKELETAL: See HPI  NEURO: See HPI  PSYCHE: Denies depression or anxiety  HEMATOLOGIC: Denies hx of blood clots, or easy bleeding follow-up for injection. The above note was creating using Dragon speech recognition technology. Please excuse any typos.     April Talbert MD

## 2022-02-10 ENCOUNTER — HOSPITAL ENCOUNTER (EMERGENCY)
Facility: HOSPITAL | Age: 82
Discharge: NURSING, REHAB OR LONG-TERM CARE | End: 2022-02-11
Attending: EMERGENCY MEDICINE
Payer: MEDICARE

## 2022-02-10 DIAGNOSIS — M25.551 HIP PAIN, CHRONIC, RIGHT: Primary | ICD-10-CM

## 2022-02-10 DIAGNOSIS — G89.29 HIP PAIN, CHRONIC, RIGHT: Primary | ICD-10-CM

## 2022-02-10 LAB
GLUCOSE BLDC GLUCOMTR-MCNC: 246 MG/DL (ref 70–99)
SARS-COV-2 RNA RESP QL NAA+PROBE: NOT DETECTED

## 2022-02-10 PROCEDURE — 82962 GLUCOSE BLOOD TEST: CPT

## 2022-02-10 PROCEDURE — 99285 EMERGENCY DEPT VISIT HI MDM: CPT

## 2022-02-10 RX ORDER — HYDROCODONE BITARTRATE AND ACETAMINOPHEN 5; 325 MG/1; MG/1
1 TABLET ORAL ONCE
Status: COMPLETED | OUTPATIENT
Start: 2022-02-10 | End: 2022-02-10

## 2022-02-11 VITALS
DIASTOLIC BLOOD PRESSURE: 72 MMHG | WEIGHT: 188 LBS | BODY MASS INDEX: 34 KG/M2 | RESPIRATION RATE: 18 BRPM | HEART RATE: 92 BPM | SYSTOLIC BLOOD PRESSURE: 199 MMHG | OXYGEN SATURATION: 99 % | TEMPERATURE: 98 F

## 2022-02-11 LAB — GLUCOSE BLDC GLUCOMTR-MCNC: 206 MG/DL (ref 70–99)

## 2022-02-11 PROCEDURE — 97162 PT EVAL MOD COMPLEX 30 MIN: CPT

## 2022-02-11 PROCEDURE — 97530 THERAPEUTIC ACTIVITIES: CPT

## 2022-02-11 PROCEDURE — 97166 OT EVAL MOD COMPLEX 45 MIN: CPT

## 2022-02-11 PROCEDURE — 82962 GLUCOSE BLOOD TEST: CPT

## 2022-02-11 NOTE — ED QUICK NOTES
Pt to be sent to [facility], [place] in [address] Bed 23B Attempted to call report at [phone]. No answer. Will try back. Pt made aware.

## 2022-02-11 NOTE — ED QUICK NOTES
Pts BG checked prior to eating meal ordered. [facility] here at this time as well. Will retry calling [facility] for Nurse to nurse report.

## 2022-02-11 NOTE — CM/SW NOTE
Called by Dr. Jose Bradford to arrange for SNF placement for patient. RADHA met with patient and daughter Inga Herrera at pt's bedside. Pt and daughter Inga Herrera reside together in a home they purchased in 10/21. Per Daughter Inga Herrera patient has c/o hip pain intermittently for quite some time. Per Daughter Inga Herrera patient saw an ortho MD last week who informed her it was tendonitis and RX'd valtaran cream which was working great until today - now patient c/o severe hip pain and was unable to ambulate down the steps in their home and BLS had to be called to assist and then once they arrived via private auto to ER patient was unable to get out of daughter's car. Discussed SNF placement with patient and daughter Inga Herrera - both agreeable. RADHA explained to patient and daughter Inga Herrera the [facility] waiver for SNF placement for [employer]  on 22 so instead of being able to place patient to a SNF tonight we will have to obtain PT/OT C/S's in the AM and then we will be able to place patient to a SNF tomorrow - likely afternoon/late afternoon and patient will remain in ER until SNF placement. Patient and daughter v/u and agreeable to the above. ERCM provided pt and pt's daughter with SNF list - pt's daughter Inga Herrera requests patient go to [facility] for SNF placement. Christiano Herrera does not want patient to go to [facility] [place] or [facility]. informed christiano Herrera to let [facility]. know of a few other locations she would prefer in case [facility] does not have beds. Daughter v/u. Informed Daughter Inga Herrera we will keep her updated [address] accepts patient. Daughter v/u. Dr. Jose Bradford and Zach Cedeno RN updated on the above SNF discharge plan. Both v/u. RADHA also notified Soto Mathur RN of the above and requested patient remain in ER until we are able to get patient into a SNF which will likely be tomorrow. Elke Mcneil agreeable to patient remaining in ER.     ERC will place PT/OT C/S's via [employer] and will start pt's SNF referral in Aidin to [facility].

## 2022-02-11 NOTE — ED QUICK NOTES
PT/Ot to bedside. Pt previously placed to bedpan as she states she cannot walk or move to chair due to b/l hip pain more so on the right. Linen soiled and changed. Gait belt used however pt anxious during transition. IV line remains in place until discharge. No shortening noted on assessment.

## 2022-02-11 NOTE — ED INITIAL ASSESSMENT (HPI)
Patient reports bilateral hip pain that radiates up in to stomach.  Patient states she has been here for the same in the past.

## 2022-02-11 NOTE — CM/SW NOTE
Jesse Flores RN called stating pt's daughter requesting to speak with ERCM. ERCM met with patient's daughter Neo Mcgraw at pt's bedside and she reviewed SNF list in it's entirety and requests ERCM to add [address] to pt's SNF referral. Pt's daughter requests [facility] be a \"last resort\" since it is so far from her home. Daughter also requests pt's blood sugar to be checked as pt is DM and took insulin this AM and ate breakfast but has not ate since - except for fernando baltazar in ER prior to taking her Norco. Patient is aymptomatic. Jesse Flores RN notified of the above - ER-T will check pt's blood sugar now.     ERCM updated pt's SNF referral in Aidin to include the above SNF's per pt's daughter's request.

## 2022-02-11 NOTE — CM/SW NOTE
Called PT/OT and spoke with Katlin Lofton regarding the need for evaluations so that patient can be placed in a SNF. Gurjit Foots, rehab services will have PT and OT come to ER to evaluate patient as soon as they are available to do so.

## 2022-02-11 NOTE — ED QUICK NOTES
This RN spoke with pt's daughter Chico Bojorquezner. Chico White made aware OT and PT to bedside to assess and evaluate pt. Awaiting POC/discharge recommendations. Will consult and or follow up accordingly.

## 2022-02-11 NOTE — CM/SW NOTE
Per Kishan the pt has been accepted to [facility] in [place]. Going to room # 23B. Nurse to nurse report: [phone]. [facility] called for BLS transport.  ETA: 1500    PCS completed

## 2022-02-11 NOTE — CM/SW NOTE
PT/OT C/S's placed via epic to eval for SNF placement. SNF referral placed via Aidin to [facility]. ERCM attached face sheet, IP transfer report, COVID vaccine record, Negative COVID result, PT/OT orders to eval for SNF placement and pertinent ERCM notes via [facility]idin to [facility]. ERCM will also send pt's ER Encounter Summary via Aidin once ER MD note is completed.

## 2022-02-15 PROBLEM — I10 PRIMARY HYPERTENSION: Status: ACTIVE | Noted: 2022-02-15

## 2022-02-15 PROBLEM — E78.2 MIXED HYPERLIPIDEMIA: Status: ACTIVE | Noted: 2022-02-15

## 2022-02-15 PROBLEM — N18.30 STAGE 3 CHRONIC KIDNEY DISEASE, UNSPECIFIED WHETHER STAGE 3A OR 3B CKD (HCC): Status: ACTIVE | Noted: 2021-11-15

## 2022-02-21 ENCOUNTER — TELEPHONE (OUTPATIENT)
Dept: PHYSICAL THERAPY | Facility: HOSPITAL | Age: 82
End: 2022-02-21

## 2022-02-21 NOTE — TELEPHONE ENCOUNTER
Called and spoke with pt's daughter about pt being admitted to inpatient facility. Instructed pt that we would need a new order for outpatient PT. Since there was a change in her medical status by being admitted to a facility her physician would need to clear her for outpatient PT. Pt's daughter was upset about this but agreed to take care of it. Will follow-up with pt at end of the week to make sure that a new order has been placed before canceling any of the appts.

## 2022-02-23 ENCOUNTER — APPOINTMENT (OUTPATIENT)
Dept: PHYSICAL THERAPY | Facility: HOSPITAL | Age: 82
End: 2022-02-23
Attending: FAMILY MEDICINE
Payer: MEDICARE

## 2022-02-28 ENCOUNTER — TELEPHONE (OUTPATIENT)
Dept: ENDOCRINOLOGY CLINIC | Facility: CLINIC | Age: 82
End: 2022-02-28

## 2022-02-28 ENCOUNTER — TELEPHONE (OUTPATIENT)
Dept: FAMILY MEDICINE CLINIC | Facility: CLINIC | Age: 82
End: 2022-02-28

## 2022-02-28 NOTE — TELEPHONE ENCOUNTER
Delisa from [facility] home health called in has questions about pt medications repaglinide and novolog insulin.  Please call

## 2022-02-28 NOTE — TELEPHONE ENCOUNTER
Patient was open for home care today 2/28 by [facility] home health. She will have a nurse,physical and occupational therapy.

## 2022-03-01 ENCOUNTER — TELEPHONE (OUTPATIENT)
Dept: FAMILY MEDICINE CLINIC | Facility: CLINIC | Age: 82
End: 2022-03-01

## 2022-03-01 ENCOUNTER — APPOINTMENT (OUTPATIENT)
Dept: PHYSICAL THERAPY | Facility: HOSPITAL | Age: 82
End: 2022-03-01
Attending: FAMILY MEDICINE
Payer: MEDICARE

## 2022-03-01 ENCOUNTER — PATIENT OUTREACH (OUTPATIENT)
Dept: CASE MANAGEMENT | Age: 82
End: 2022-03-01

## 2022-03-01 PROCEDURE — 1111F DSCHRG MED/CURRENT MED MERGE: CPT

## 2022-03-01 NOTE — TELEPHONE ENCOUNTER
rn called Delisa RN back , Delisa wanted to know if patient was using novolog insulin, and prandin  Per lov 10/27/21   Pt on lantus 40 units daily  And prandin  1mg tid meals  Pt was not on novolog  Delisa verbalized understanding.

## 2022-03-01 NOTE — TELEPHONE ENCOUNTER
Spoke to pt for TCM today. TCM/HFU appt recommended by 3/10/2022 as pt is at risk for readmission. Patient has a 6 month follow up scheduled on 3/8/2022 at 10:00 am    [facility] (last date for TCM): 3/10/2022    TRIAGE:  Please notify Dr. Emily Javier of the above and ask if the visit type can be changed to a TCM HFU visit type (6010) as pt would greatly benefit from TCM/HFU appt. Thank you!

## 2022-03-02 ENCOUNTER — TELEPHONE (OUTPATIENT)
Dept: PHYSICAL THERAPY | Facility: HOSPITAL | Age: 82
End: 2022-03-02

## 2022-03-03 ENCOUNTER — APPOINTMENT (OUTPATIENT)
Dept: PHYSICAL THERAPY | Facility: HOSPITAL | Age: 82
End: 2022-03-03
Attending: FAMILY MEDICINE
Payer: MEDICARE

## 2022-03-03 NOTE — TELEPHONE ENCOUNTER
Called patient to schedule her appt , gave her Tuesday 03/08 at 2:45 , but states she has to check with her daughters and she will call us back.

## 2022-03-07 ENCOUNTER — APPOINTMENT (OUTPATIENT)
Dept: PHYSICAL THERAPY | Facility: HOSPITAL | Age: 82
End: 2022-03-07
Attending: FAMILY MEDICINE
Payer: MEDICARE

## 2022-03-09 ENCOUNTER — OFFICE VISIT (OUTPATIENT)
Dept: ENDOCRINOLOGY CLINIC | Facility: CLINIC | Age: 82
End: 2022-03-09
Payer: MEDICARE

## 2022-03-09 ENCOUNTER — OFFICE VISIT (OUTPATIENT)
Dept: FAMILY MEDICINE CLINIC | Facility: CLINIC | Age: 82
End: 2022-03-09
Payer: MEDICARE

## 2022-03-09 VITALS
BODY MASS INDEX: 33.68 KG/M2 | OXYGEN SATURATION: 97 % | RESPIRATION RATE: 19 BRPM | DIASTOLIC BLOOD PRESSURE: 78 MMHG | WEIGHT: 183 LBS | SYSTOLIC BLOOD PRESSURE: 136 MMHG | HEIGHT: 62 IN | HEART RATE: 72 BPM

## 2022-03-09 VITALS
HEART RATE: 76 BPM | WEIGHT: 180 LBS | DIASTOLIC BLOOD PRESSURE: 75 MMHG | BODY MASS INDEX: 33 KG/M2 | SYSTOLIC BLOOD PRESSURE: 161 MMHG

## 2022-03-09 DIAGNOSIS — M25.551 BILATERAL HIP PAIN: ICD-10-CM

## 2022-03-09 DIAGNOSIS — I10 PRIMARY HYPERTENSION: Primary | ICD-10-CM

## 2022-03-09 DIAGNOSIS — E78.5 DYSLIPIDEMIA: ICD-10-CM

## 2022-03-09 DIAGNOSIS — Z79.4 TYPE 2 DIABETES MELLITUS WITH CHRONIC KIDNEY DISEASE, WITH LONG-TERM CURRENT USE OF INSULIN, UNSPECIFIED CKD STAGE (HCC): Primary | ICD-10-CM

## 2022-03-09 DIAGNOSIS — E11.22 TYPE 2 DIABETES MELLITUS WITH CHRONIC KIDNEY DISEASE, WITH LONG-TERM CURRENT USE OF INSULIN, UNSPECIFIED CKD STAGE (HCC): Primary | ICD-10-CM

## 2022-03-09 DIAGNOSIS — M25.552 BILATERAL HIP PAIN: ICD-10-CM

## 2022-03-09 LAB
CARTRIDGE LOT#: ABNORMAL NUMERIC
GLUCOSE BLOOD: 141
HEMOGLOBIN A1C: 9.2 % (ref 4.3–5.6)
TEST STRIP LOT #: NORMAL NUMERIC

## 2022-03-09 PROCEDURE — 3078F DIAST BP <80 MM HG: CPT | Performed by: INTERNAL MEDICINE

## 2022-03-09 PROCEDURE — 83036 HEMOGLOBIN GLYCOSYLATED A1C: CPT | Performed by: INTERNAL MEDICINE

## 2022-03-09 PROCEDURE — 3075F SYST BP GE 130 - 139MM HG: CPT | Performed by: FAMILY MEDICINE

## 2022-03-09 PROCEDURE — 99214 OFFICE O/P EST MOD 30 MIN: CPT | Performed by: FAMILY MEDICINE

## 2022-03-09 PROCEDURE — 99214 OFFICE O/P EST MOD 30 MIN: CPT | Performed by: INTERNAL MEDICINE

## 2022-03-09 PROCEDURE — 82947 ASSAY GLUCOSE BLOOD QUANT: CPT | Performed by: INTERNAL MEDICINE

## 2022-03-09 PROCEDURE — 3008F BODY MASS INDEX DOCD: CPT | Performed by: FAMILY MEDICINE

## 2022-03-09 PROCEDURE — 3077F SYST BP >= 140 MM HG: CPT | Performed by: INTERNAL MEDICINE

## 2022-03-09 PROCEDURE — 36416 COLLJ CAPILLARY BLOOD SPEC: CPT | Performed by: INTERNAL MEDICINE

## 2022-03-09 PROCEDURE — 3078F DIAST BP <80 MM HG: CPT | Performed by: FAMILY MEDICINE

## 2022-03-10 ENCOUNTER — APPOINTMENT (OUTPATIENT)
Dept: PHYSICAL THERAPY | Facility: HOSPITAL | Age: 82
End: 2022-03-10
Attending: FAMILY MEDICINE
Payer: MEDICARE

## 2022-03-12 NOTE — TELEPHONE ENCOUNTER
Please review refill protocol failed/ no protocol  Requested Prescriptions   Pending Prescriptions Disp Refills    REPAGLINIDE 1 MG Oral Tab [Pharmacy Med Name: REPAGLINIDE 1 MG Tablet] 270 tablet 0     Sig: TAKE 1 TABLET THREE TIMES DAILY BEFORE MEALS        Diabetes Medication Protocol Failed - 3/11/2022  9:13 PM        Failed - Last A1C < 7.5 and within past 6 months     Lab Results   Component Value Date    A1C 9.2 (A) 03/09/2022               Passed - Appointment in past 6 or next 3 months        Passed - GFR  > 50     Lab Results   Component Value Date    GFRAA 51 (L) 05/12/2021                 Passed - GFR in the past 12 months

## 2022-03-14 ENCOUNTER — APPOINTMENT (OUTPATIENT)
Dept: PHYSICAL THERAPY | Facility: HOSPITAL | Age: 82
End: 2022-03-14
Attending: FAMILY MEDICINE
Payer: MEDICARE

## 2022-03-14 RX ORDER — REPAGLINIDE 1 MG/1
1 TABLET ORAL
Qty: 270 TABLET | Refills: 1 | Status: SHIPPED | OUTPATIENT
Start: 2022-03-14

## 2022-03-16 ENCOUNTER — APPOINTMENT (OUTPATIENT)
Dept: PHYSICAL THERAPY | Facility: HOSPITAL | Age: 82
End: 2022-03-16
Attending: FAMILY MEDICINE
Payer: MEDICARE

## 2022-03-17 ENCOUNTER — TELEPHONE (OUTPATIENT)
Dept: FAMILY MEDICINE CLINIC | Facility: CLINIC | Age: 82
End: 2022-03-17

## 2022-03-17 NOTE — TELEPHONE ENCOUNTER
Bridget So from [facility] stated patient is being discharged from home health next week per plan of care.

## 2022-03-18 RX ORDER — ATORVASTATIN CALCIUM 80 MG/1
80 TABLET, FILM COATED ORAL DAILY
Qty: 90 TABLET | Refills: 1 | Status: SHIPPED | OUTPATIENT
Start: 2022-03-18

## 2022-03-18 NOTE — TELEPHONE ENCOUNTER
Spoke with pt,  verified  Pt was informed med ref for gen lipitor was sent today to pharm and due for fasting lab,  order placed by endo. Pt stated understanding.

## 2022-03-18 NOTE — TELEPHONE ENCOUNTER
Please review. Protocol failed. LOV 3/9 but ordered labs were not drawn. Left patient a message to please get labs drawn.   Requested Prescriptions   Pending Prescriptions Disp Refills    ATORVASTATIN 80 MG Oral Tab [Pharmacy Med Name: ATORVASTATIN CALCIUM 80 MG Tablet] 90 tablet 1     Sig: TAKE 1 TABLET EVERY DAY        Cholesterol Medication Protocol Failed - 3/18/2022 10:38 AM        Failed - ALT in past 12 months        Failed - LDL in past 12 months        Failed - Last ALT < 80       Lab Results   Component Value Date    ALT 13 02/03/2021             Failed - Last LDL < 130     Lab Results   Component Value Date    LDL 95 02/03/2021               Passed - Appointment in past 12 or next 3 months            Future Appointments         Provider Department Appt Notes    In 3 weeks Anuj Rivers MD [facility], [address], [place] Hip pain    In 1 month Margarita Oliver MD [facility] Ophthalmology EP/ VF, OCT and DM EE    In 2 months Nigel Landeros MD [address], [facility], [facility]  Outpatient Visits              1 week ago Primary hypertension    [address] [facility], [place], Adilia Smith MD    Office Visit    1 week ago Type 2 diabetes mellitus with chronic kidney disease, with long-term current use of insulin, unspecified CKD stage [facility])    [facility], Barbara Landeros MD    Office Visit    1 month ago Gianna-trochanteric left hip tendinitis    [facility] Rani Khan MD    Office Visit    2 months ago Left hip pain    [facility], Barbara Multani MD    Office Visit    3 months ago Primary open angle glaucoma (POAG) of both eyes, moderate stage    [facility] Ophthalmology Margarita Oliver MD    Office Visit

## 2022-03-18 NOTE — TELEPHONE ENCOUNTER
Spoke with pt's daughter,  verified  She stated she is returning our call regarding pt rx and lab order. She requested to call pt H# [phone], C# [phone].        Future Appointments   Date Time Provider Ebonie Howard   2022  8:45 AM Joe Rm MD [facility]   2022  1:00 PM Morenita Solorio MD [facility]   2022 10:45 AM Anuradha Corbett MD [facility]

## 2022-03-21 ENCOUNTER — APPOINTMENT (OUTPATIENT)
Dept: PHYSICAL THERAPY | Facility: HOSPITAL | Age: 82
End: 2022-03-21
Attending: FAMILY MEDICINE
Payer: MEDICARE

## 2022-03-22 ENCOUNTER — TELEPHONE (OUTPATIENT)
Dept: FAMILY MEDICINE CLINIC | Facility: CLINIC | Age: 82
End: 2022-03-22

## 2022-03-23 ENCOUNTER — TELEPHONE (OUTPATIENT)
Dept: FAMILY MEDICINE CLINIC | Facility: CLINIC | Age: 82
End: 2022-03-23

## 2022-03-23 NOTE — TELEPHONE ENCOUNTER
Patient calling with blood sugar readings for the last 2 weeks. Still taking repaglinide 1mg 1 tablet 3 times daily and lantus 40 units nightly. Please review and advise.      3/9/2022 AM fasting 147  3/10/2022 before lunch 112  3/11/2022 before lunch 145  3/12/2022 before dinner 167  3/13/2022 30 minutes after dinner 201  3/14/2022 AM fasting 195  3/15/2022 after breakfast 197  3/16/2022 after breakfast 177  3/17/2022 after lunch 171  3/18/2022 before dinner 178  3/19/2022 after dinner 215  3/20/2022 before breakfast 178  3/21/2022 after breakfast 207  3/22/2022 after lunch 126  3/23/2022 before breakfast 147, after lunch 126

## 2022-03-28 ENCOUNTER — TELEPHONE (OUTPATIENT)
Dept: FAMILY MEDICINE CLINIC | Facility: CLINIC | Age: 82
End: 2022-03-28

## 2022-03-28 NOTE — TELEPHONE ENCOUNTER
Spoke with patient ( verified)--asking where she can get labs ordered by Dr. Donn Buckley ([facility]). Contacted [facility]  ([phone])--confirmed patient can come to [facility] office, [address] for labs to be drawn for [facility]. Patient verbalizes understanding and agreement. No further questions/concerns at this time.

## 2022-03-30 ENCOUNTER — LAB ENCOUNTER (OUTPATIENT)
Dept: LAB | Age: 82
End: 2022-03-30
Attending: INTERNAL MEDICINE
Payer: MEDICARE

## 2022-03-31 LAB
ALBUMIN/GLOBULIN RATIO: 1.2 (CALC) (ref 1–2.5)
ALBUMIN: 4.1 G/DL (ref 3.6–5.1)
ALKALINE PHOSPHATASE: 83 U/L (ref 37–153)
ALT: 14 U/L (ref 6–29)
AST: 14 U/L (ref 10–35)
BILIRUBIN, TOTAL: 0.5 MG/DL (ref 0.2–1.2)
BUN/CREATININE RATIO: 14 (CALC) (ref 6–22)
BUN: 15 MG/DL (ref 7–25)
CALCIUM: 9.4 MG/DL (ref 8.6–10.4)
CARBON DIOXIDE: 27 MMOL/L (ref 20–32)
CHLORIDE: 104 MMOL/L (ref 98–110)
CHOL/HDLC RATIO: 4.7 (CALC)
CHOLESTEROL, TOTAL: 186 MG/DL
CREATININE: 1.04 MG/DL (ref 0.6–0.88)
EGFR IF AFRICN AM: 59 ML/MIN/1.73M2
EGFR IF NONAFRICN AM: 51 ML/MIN/1.73M2
GLOBULIN: 3.4 G/DL (CALC) (ref 1.9–3.7)
GLUCOSE: 152 MG/DL (ref 65–99)
HDL CHOLESTEROL: 40 MG/DL
LDL-CHOLESTEROL: 121 MG/DL (CALC)
NON-HDL CHOLESTEROL: 146 MG/DL (CALC)
POTASSIUM: 4.5 MMOL/L (ref 3.5–5.3)
PROTEIN, TOTAL: 7.5 G/DL (ref 6.1–8.1)
SODIUM: 140 MMOL/L (ref 135–146)
TRIGLYCERIDES: 139 MG/DL

## 2022-04-01 ENCOUNTER — TELEPHONE (OUTPATIENT)
Dept: ENDOCRINOLOGY CLINIC | Facility: CLINIC | Age: 82
End: 2022-04-01

## 2022-04-01 NOTE — TELEPHONE ENCOUNTER
Received a fax from [address] [place] [address] with labs collected on 3/30/2022. Placed on providers desk for review.

## 2022-04-02 ENCOUNTER — TELEPHONE (OUTPATIENT)
Dept: ENDOCRINOLOGY CLINIC | Facility: CLINIC | Age: 82
End: 2022-04-02

## 2022-04-02 RX ORDER — INSULIN GLARGINE 100 [IU]/ML
40 INJECTION, SOLUTION SUBCUTANEOUS DAILY
Qty: 30 ML | Refills: 0 | Status: SHIPPED | OUTPATIENT
Start: 2022-04-02 | End: 2022-04-04

## 2022-04-03 NOTE — TELEPHONE ENCOUNTER
Kidney function is slightly low but stable  LDL is slightly high--> c/w statin and low fat diet    Thanks

## 2022-04-04 RX ORDER — INSULIN GLARGINE 100 [IU]/ML
40 INJECTION, SOLUTION SUBCUTANEOUS DAILY
Qty: 30 ML | Refills: 0 | Status: SHIPPED | OUTPATIENT
Start: 2022-04-04

## 2022-04-04 NOTE — TELEPHONE ENCOUNTER
rn called with message below by dr Alex Llamas, patient verbalized understanding of all instructions,

## 2022-04-05 RX ORDER — INSULIN GLARGINE 100 [IU]/ML
40 INJECTION, SOLUTION SUBCUTANEOUS DAILY
Qty: 30 ML | Refills: 0 | Status: SHIPPED | OUTPATIENT
Start: 2022-04-05

## 2022-04-06 ENCOUNTER — MED REC SCAN ONLY (OUTPATIENT)
Dept: FAMILY MEDICINE CLINIC | Facility: CLINIC | Age: 82
End: 2022-04-06

## 2022-04-06 NOTE — TELEPHONE ENCOUNTER
Spoke to pharmacist - Malissa went through insurance: co-pay for 2 boxes (30mL) is $105    Spoke to patient to advise Malissa does not need PA - advised patient co-pay for 2 boxes (30mL) is $105 - patient stated understanding and denied further questions at this time    PA dept - No PA needed -thank you for checking!

## 2022-04-06 NOTE — TELEPHONE ENCOUNTER
Hello, Please clarify which medication you are referring to or which TE date. There has not been a PA request sent to PA dept that I can see. Thank you.

## 2022-04-06 NOTE — TELEPHONE ENCOUNTER
Pt called in to follow up the refill. She states the pharmacy has not got it yet. Pt is running very low.  Please resend thank you

## 2022-04-06 NOTE — TELEPHONE ENCOUNTER
PA department: Can you please clarify if this patient needs a new PA or we are waiting on a determination? Unsure as to what chart if reflecting regarding status.  Thanks

## 2022-04-22 ENCOUNTER — TELEPHONE (OUTPATIENT)
Dept: FAMILY MEDICINE CLINIC | Facility: CLINIC | Age: 82
End: 2022-04-22

## 2022-04-22 ENCOUNTER — TELEPHONE (OUTPATIENT)
Dept: CASE MANAGEMENT | Age: 82
End: 2022-04-22

## 2022-04-22 NOTE — TELEPHONE ENCOUNTER
Pt requesting to get a referral to see Dr Maya Ramires. Patient is requesting referral.     Name of specialist and specialty department : Opthamology  Dept. Reason for visit with the specialist: Visual Filed, OCT and DM Eye exam   Address of the specialist office: [facility] in [place]   Appointment date: 4/28/2022 at 1:00pm.         CSS informed patient the turnaround time for referral is 5-7 business days. Patient was informed to check [facility] for referral status.

## 2022-04-22 NOTE — TELEPHONE ENCOUNTER
Dr. Eduarda Nixon,    The patient called requesting referral to Dr. Natali Mata for visual field, OCT and DM eye exam.    Pended referral please review diagnosis and sign off if you agree. Thank you.   Bartolome Nicholson

## 2022-04-28 ENCOUNTER — OFFICE VISIT (OUTPATIENT)
Dept: OPHTHALMOLOGY | Facility: CLINIC | Age: 82
End: 2022-04-28
Payer: MEDICARE

## 2022-04-28 ENCOUNTER — TELEPHONE (OUTPATIENT)
Dept: FAMILY MEDICINE CLINIC | Facility: CLINIC | Age: 82
End: 2022-04-28

## 2022-04-28 DIAGNOSIS — H40.1132 PRIMARY OPEN ANGLE GLAUCOMA (POAG) OF BOTH EYES, MODERATE STAGE: Primary | ICD-10-CM

## 2022-04-28 DIAGNOSIS — H43.393 FLOATERS IN VISUAL FIELD, BILATERAL: ICD-10-CM

## 2022-04-28 DIAGNOSIS — E11.9 DIABETES MELLITUS TYPE 2 WITHOUT RETINOPATHY (HCC): ICD-10-CM

## 2022-04-28 DIAGNOSIS — Z96.1 PSEUDOPHAKIA OF BOTH EYES: ICD-10-CM

## 2022-04-28 PROCEDURE — 92133 CPTRZD OPH DX IMG PST SGM ON: CPT | Performed by: OPHTHALMOLOGY

## 2022-04-28 PROCEDURE — 92014 COMPRE OPH EXAM EST PT 1/>: CPT | Performed by: OPHTHALMOLOGY

## 2022-04-28 PROCEDURE — 92083 EXTENDED VISUAL FIELD XM: CPT | Performed by: OPHTHALMOLOGY

## 2022-04-28 NOTE — ASSESSMENT & PLAN NOTE
IOP is stable. Continue taking Timolol one drop twice a day in both eyes. VF and OCT test done in the office today showing stable result. Will have patient back in 4 months for a pressure check.

## 2022-04-28 NOTE — PATIENT INSTRUCTIONS
Primary open angle glaucoma (POAG) of both eyes, moderate stage  IOP is stable. Continue taking Timolol one drop twice a day in both eyes. VF and OCT test done in the office today showing stable result. Will have patient back in 4 months for a pressure check. Pseudophakia of both eyes  No treatment. Floaters in visual field, bilateral   There is no evidence of retinal pathology. All signs and symptoms of retinal detachment/tears explained in detail. Patient instructed to call the office if they experience increase in floaters, increase in flashes of light, loss of vision or curtain or veil effect. Diabetes mellitus type 2 without retinopathy ([facility] [address]  Diabetes type II: no background of retinopathy, no signs of neovascularization noted. Discussed ocular and systemic benefits of blood sugar control. Diagnosis and treatment discussed in detail with patient.

## 2022-05-02 RX ORDER — TIMOLOL MALEATE 5 MG/ML
SOLUTION/ DROPS OPHTHALMIC
Qty: 15 ML | Refills: 3 | Status: SHIPPED | OUTPATIENT
Start: 2022-05-02

## 2022-05-02 NOTE — TELEPHONE ENCOUNTER
Pt was seen on 4/28/22  Next apt is scheduled on 9/8/22 for an IOP check. Rx pended to [facility] [place]    Primary open angle glaucoma (POAG) of both eyes, moderate stage  IOP is stable. Continue taking Timolol one drop twice a day in both eyes.

## 2022-05-20 RX ORDER — LISINOPRIL 10 MG/1
10 TABLET ORAL DAILY
Qty: 90 TABLET | Refills: 1 | Status: SHIPPED | OUTPATIENT
Start: 2022-05-20

## 2022-05-20 NOTE — TELEPHONE ENCOUNTER
Refill passed per [address] protocol.   Requested Prescriptions   Pending Prescriptions Disp Refills    LISINOPRIL 10 MG Oral Tab [Pharmacy Med Name: LISINOPRIL 10 MG Tablet] 90 tablet 1     Sig: TAKE 1 TABLET EVERY DAY        Hypertensive Medications Protocol Passed - 5/20/2022  2:04 PM        Passed - CMP or BMP in past 12 months        Passed - Appointment in past 6 or next 3 months        Passed - GFR  > 50     Lab Results   Component Value Date    GFRAA 61 (L) 03/30/2022                     Recent Outpatient Visits              3 weeks ago Primary open angle glaucoma (POAG) of both eyes, moderate stage    [facility] Francoise Renee MD    Office Visit    2 months ago Primary hypertension    [address], [place], [address] Yusef Jackson MD    Office Visit    2 months ago Type 2 diabetes mellitus with chronic kidney disease, with long-term current use of insulin, unspecified CKD stage [facility])    [facility], [address] Bushra Cortez MD    Office Visit    3 months ago Gianna-trochanteric left hip tendinitis    [facility] for Mariel MD Latanya    Office Visit    4 months ago Left hip pain    [address], [place], [address] Yusef Jackson MD    Office Visit          Future Appointments         Provider Department Appt Notes    In 2 weeks Bushra Cortez MD [address], [place], [address]     In 3 months Francoise Renee, [address] [place] [address] Ophthalmology EP/ 4 mos IOP

## 2022-06-08 ENCOUNTER — OFFICE VISIT (OUTPATIENT)
Dept: ENDOCRINOLOGY CLINIC | Facility: CLINIC | Age: 82
End: 2022-06-08
Payer: MEDICARE

## 2022-06-08 VITALS — HEART RATE: 106 BPM | SYSTOLIC BLOOD PRESSURE: 147 MMHG | DIASTOLIC BLOOD PRESSURE: 76 MMHG

## 2022-06-08 DIAGNOSIS — E78.5 DYSLIPIDEMIA: ICD-10-CM

## 2022-06-08 DIAGNOSIS — E11.65 TYPE 2 DIABETES MELLITUS WITH HYPERGLYCEMIA, UNSPECIFIED WHETHER LONG TERM INSULIN USE (HCC): Primary | ICD-10-CM

## 2022-06-08 PROCEDURE — 99214 OFFICE O/P EST MOD 30 MIN: CPT | Performed by: INTERNAL MEDICINE

## 2022-06-08 PROCEDURE — 3077F SYST BP >= 140 MM HG: CPT | Performed by: INTERNAL MEDICINE

## 2022-06-08 PROCEDURE — 1126F AMNT PAIN NOTED NONE PRSNT: CPT | Performed by: INTERNAL MEDICINE

## 2022-06-08 PROCEDURE — 3078F DIAST BP <80 MM HG: CPT | Performed by: INTERNAL MEDICINE

## 2022-06-17 ENCOUNTER — TELEPHONE (OUTPATIENT)
Dept: ENDOCRINOLOGY CLINIC | Facility: CLINIC | Age: 82
End: 2022-06-17

## 2022-06-17 NOTE — TELEPHONE ENCOUNTER
Dr. Kristofer Velázquez    Patient called in to give a blood sugar update. Please advise. Patient stated she is feeling fine with no complaints. Current medications:  Lantus 40 units daily  Repaglinide 1.5 mg with breakfast , lunch and dinner ( she will prefer to cut the 1 mg pill in half    Patient is checking blood sugars before breakfast and before dinner.     Lo/9 120 AM  163 PM   6/10 133  PM   163  PM   188  PM   159  PM   133  PM  6/15 144  PM    146 AM today

## 2022-06-17 NOTE — TELEPHONE ENCOUNTER
Pt called in to give update on blood sugar levels, am 120 pm 163, am 133 pm 196, am 152 pm 206, am 165 pm 188, 169 am pm 190, am 133 pm 189, am 144 pm 181.  Please follow up

## 2022-06-20 NOTE — TELEPHONE ENCOUNTER
rn called patient with message below by dr Alex Llamas, pt verbalized understanding of instructions and said today she is 110

## 2022-06-22 RX ORDER — INSULIN GLARGINE 100 [IU]/ML
40 INJECTION, SOLUTION SUBCUTANEOUS DAILY
Qty: 30 ML | Refills: 0 | Status: SHIPPED | OUTPATIENT
Start: 2022-06-22

## 2022-06-22 NOTE — TELEPHONE ENCOUNTER
Pt called in to get refill LANTUS SOLOSTAR 100 UNIT/ML Subcutaneous Solution Pen-injector sent to [address] [place] [address] #[phone]

## 2022-07-27 ENCOUNTER — TELEPHONE (OUTPATIENT)
Dept: ENDOCRINOLOGY CLINIC | Facility: CLINIC | Age: 82
End: 2022-07-27

## 2022-07-27 NOTE — TELEPHONE ENCOUNTER
Amari Key from [facility] called in to get a peer exception to lower cost for medicine Repaglinide 1 MG Oral Tab has clinical questions to ask Rn.  Please call

## 2022-07-27 NOTE — TELEPHONE ENCOUNTER
Spoke to Aisha Kim at [facility] regarding Repaglinide. Clinical questions answered to the best of RN ability. [employer] will fax office with a determination.

## 2022-07-28 ENCOUNTER — PATIENT OUTREACH (OUTPATIENT)
Dept: CASE MANAGEMENT | Age: 82
End: 2022-07-28

## 2022-08-02 ENCOUNTER — PATIENT OUTREACH (OUTPATIENT)
Dept: CASE MANAGEMENT | Age: 82
End: 2022-08-02

## 2022-08-02 NOTE — PROGRESS NOTES
Called patient regarding CCM assessment and left a message for patient to call back when they can. Reviewed patient chart. Left contact my contact number [phone].        Time Spent This Encounter Total: 3  min medical record review  Monthly Minute Total including today: 3 minutes

## 2022-08-16 ENCOUNTER — PATIENT OUTREACH (OUTPATIENT)
Dept: CASE MANAGEMENT | Age: 82
End: 2022-08-16

## 2022-08-16 ENCOUNTER — TELEPHONE (OUTPATIENT)
Dept: CASE MANAGEMENT | Age: 82
End: 2022-08-16

## 2022-08-16 NOTE — PROGRESS NOTES
Patient stated that she moved and can not travel that long to see Dr. Jevon Torres. Patient stated will no longer see Dr. Jevon oTrres. Patient stated she is seen by [facility]. Patient stated her insurance was notified.

## 2022-08-16 NOTE — TELEPHONE ENCOUNTER
Patient is eligible for a 2022 Medicare Annual Wellness visit. This visit can be scheduled any time in the calendar year. No answer.

## 2022-09-12 RX ORDER — ATORVASTATIN CALCIUM 80 MG/1
80 TABLET, FILM COATED ORAL DAILY
Qty: 90 TABLET | Refills: 1 | Status: SHIPPED | OUTPATIENT
Start: 2022-09-12

## 2022-09-12 NOTE — TELEPHONE ENCOUNTER
Refill passed per [facility] protocol.     Requested Prescriptions   Pending Prescriptions Disp Refills    ATORVASTATIN 80 MG Oral Tab [Pharmacy Med Name: ATORVASTATIN CALCIUM 80 MG Tablet] 90 tablet 1     Sig: TAKE 1 TABLET EVERY DAY        Cholesterol Medication Protocol Passed - 9/12/2022 11:09 AM        Passed - ALT in past 12 months        Passed - LDL in past 12 months        Passed - Last ALT < 80       Lab Results   Component Value Date    ALT 14 03/30/2022             Passed - Last LDL < 130     Lab Results   Component Value Date     (H) 03/30/2022               Passed - In person appointment or virtual visit in the past 12 mos or appointment in next 3 mos       Recent Outpatient Visits              3 months ago Type 2 diabetes mellitus with hyperglycemia, unspecified whether long term insulin use [facility])    [facility], Barbara Snyder MD    Office Visit    4 months ago Primary open angle glaucoma (POAG) of both eyes, moderate stage    [facility] Melly Friend MD    Office Visit    6 months ago Primary hypertension    [place] [facility], [address], [place], General Gear, MD    Office Visit    6 months ago Type 2 diabetes mellitus with chronic kidney disease, with long-term current use of insulin, unspecified CKD stage [facility])    [facility], Barbara Snyder MD    Office Visit    7 months ago Gianna-trochanteric left hip tendinitis    [facility] for Fifi Malcolm MD    Office Visit                       Recent Outpatient Visits              3 months ago Type 2 diabetes mellitus with hyperglycemia, unspecified whether long term insulin use [facility])    [facility], Barbara Snyder MD    Office Visit    4 months ago Primary open angle glaucoma (POAG) of both eyes, moderate stage    [facility] Aura Menard MD    Office Visit    6 months ago Primary hypertension    [place] [facility] [place], [address], Barbara Schmid MD    Office Visit    6 months ago Type 2 diabetes mellitus with chronic kidney disease, with long-term current use of insulin, unspecified CKD stage [facility])    [facility], [address], Barbara Poon MD    Office Visit    7 months ago Gianna-trochanteric left hip tendinitis    [facility] for Katelyn Uriostegui MD    Office Visit

## 2022-09-16 ENCOUNTER — TELEPHONE (OUTPATIENT)
Dept: FAMILY MEDICINE CLINIC | Facility: CLINIC | Age: 82
End: 2022-09-16

## 2022-09-21 RX ORDER — REPAGLINIDE 1 MG/1
1 TABLET ORAL
Qty: 270 TABLET | Refills: 1 | Status: SHIPPED | OUTPATIENT
Start: 2022-09-21

## 2022-09-21 NOTE — TELEPHONE ENCOUNTER
Please review. Protocol failed / No protocol.    Requested Prescriptions   Pending Prescriptions Disp Refills    REPAGLINIDE 1 MG Oral Tab [Pharmacy Med Name: REPAGLINIDE 1 MG Tablet] 270 tablet 1     Sig: TAKE 1 TABLET THREE TIMES DAILY BEFORE MEALS        Diabetes Medication Protocol Failed - 9/21/2022 10:33 AM        Failed - Last A1C < 7.5 and within past 6 months     Lab Results   Component Value Date    A1C 9.2 (A) 03/09/2022               Failed - In person appointment or virtual visit in the past 6 mos or appointment in next 3 mos       Recent Outpatient Visits              3 months ago Type 2 diabetes mellitus with hyperglycemia, unspecified whether long term insulin use [facility])    [place] [facility], [address], [facility] Bushra Cortez MD    Office Visit    4 months ago Primary open angle glaucoma (POAG) of both eyes, moderate stage    [facility] Francoise Renee MD    Office Visit    6 months ago Primary hypertension    [facility], [address], [facility] Yusef Jackson MD    Office Visit    6 months ago Type 2 diabetes mellitus with chronic kidney disease, with long-term current use of insulin, unspecified CKD stage [facility])    [place] [facility], [address], [facility] Bushra Cortez MD    Office Visit    8 months ago Gianna-trochanteric left hip tendinitis    [facility] Marielprudencio Pelaez MD    Office Visit                 Passed - EGFRCR or GFRAA > 50     GFR Evaluation  GFRAA: 61 , resulted on 3/30/2022            Passed - GFR in the past 12 months                Recent Outpatient Visits              3 months ago Type 2 diabetes mellitus with hyperglycemia, unspecified whether long term insulin use [facility])    [place] [facility], [address], [facility] Bushra Cortez MD    Office Visit    4 months ago Primary open angle glaucoma (POAG) of both eyes, moderate stage    [facility] Trey Yee MD    Office Visit    6 months ago Primary hypertension    [address], [address] Dasha Page MD    Office Visit    6 months ago Type 2 diabetes mellitus with chronic kidney disease, with long-term current use of insulin, unspecified CKD stage [facility])    [facility], [address] Izabella Begum MD    Office Visit    8 months ago Gianna-trochanteric left hip tendinitis    [facility] for Sharita Villatoro MD    Office Visit

## 2022-11-03 DIAGNOSIS — G25.2 DYSTONIC TREMOR: ICD-10-CM

## 2022-11-03 RX ORDER — PRIMIDONE 250 MG/1
TABLET ORAL
Qty: 30 TABLET | Refills: 0 | Status: SHIPPED | OUTPATIENT
Start: 2022-11-03

## 2022-11-03 NOTE — TELEPHONE ENCOUNTER
The patient is requesting a refill on: Primidone 250mg - Take 0.5 tablets (125 mg total) by mouth Q12H. Last OV: 11/15/21  Next OV: None scheduled  Last refilled: 3/8/22 #90 with 3 refills    Message to the front end to please contact the patient and schedule an office visit. Thanks. Reviewed and electronically signed by:  [address], [facility]

## 2023-01-17 ENCOUNTER — TELEPHONE (OUTPATIENT)
Dept: FAMILY MEDICINE CLINIC | Facility: CLINIC | Age: 83
End: 2023-01-17

## 2023-01-17 NOTE — TELEPHONE ENCOUNTER
Patient is eligible for a 2023 Medicare Annual Wellness visit. This visit can be scheduled any time in the calendar year. Left message @ [facility] to call back.

## 2023-02-09 RX ORDER — LISINOPRIL 10 MG/1
TABLET ORAL
Qty: 90 TABLET | Refills: 1 | Status: SHIPPED | OUTPATIENT
Start: 2023-02-09

## 2023-02-17 RX ORDER — TIMOLOL MALEATE 5 MG/ML
SOLUTION/ DROPS OPHTHALMIC
Qty: 15 ML | Refills: 3 | OUTPATIENT
Start: 2023-02-17

## 2023-02-17 NOTE — TELEPHONE ENCOUNTER
LDE: 4/28/22  Last visit: 4/28/22  Due for: VF, OCT, dilated eye exam and photos  Upcoming visit:  none  Spoke with patient. She says she has moved far away and now is under the care of another ophthalmologist, so she does not need this filled. Routed to [facility] for info.

## 2023-07-21 ENCOUNTER — EXTERNAL RECORD (OUTPATIENT)
Dept: HEALTH INFORMATION MANAGEMENT | Facility: OTHER | Age: 83
End: 2023-07-21

## 2023-11-02 ENCOUNTER — EXTERNAL LAB (OUTPATIENT)
Dept: OTHER | Age: 83
End: 2023-11-02

## 2023-11-02 ENCOUNTER — EXTERNAL RECORD (OUTPATIENT)
Dept: HEALTH INFORMATION MANAGEMENT | Facility: OTHER | Age: 83
End: 2023-11-02

## 2023-11-02 LAB
BNP SERPL-MCNC: 21 PG/ML
HBA1C MFR BLD: 12.1 % OF TOTAL HGB

## 2023-11-09 ENCOUNTER — EXTERNAL RECORD (OUTPATIENT)
Dept: HEALTH INFORMATION MANAGEMENT | Facility: OTHER | Age: 83
End: 2023-11-09

## 2024-03-21 ENCOUNTER — APPOINTMENT (OUTPATIENT)
Dept: ENDOCRINOLOGY | Age: 84
End: 2024-03-21

## 2024-05-07 ENCOUNTER — APPOINTMENT (OUTPATIENT)
Dept: ENDOCRINOLOGY | Age: 84
End: 2024-05-07

## 2025-04-28 ENCOUNTER — APPOINTMENT (OUTPATIENT)
Dept: GENERAL RADIOLOGY | Age: 85
End: 2025-04-28
Attending: NURSE PRACTITIONER

## 2025-04-28 ENCOUNTER — HOSPITAL ENCOUNTER (OUTPATIENT)
Age: 85
Setting detail: OBSERVATION
Discharge: HOME | End: 2025-04-29
Attending: EMERGENCY MEDICINE

## 2025-04-28 DIAGNOSIS — R07.89 ATYPICAL CHEST PAIN: Primary | ICD-10-CM

## 2025-04-28 LAB
ALBUMIN SERPL-MCNC: 3.5 G/DL (ref 3.4–5)
ALBUMIN/GLOB SERPL: 0.7 {RATIO} (ref 1–2.4)
ALP SERPL-CCNC: 111 UNITS/L (ref 45–117)
ALT SERPL-CCNC: 28 UNITS/L
ANION GAP SERPL CALC-SCNC: 9 MMOL/L (ref 7–19)
AST SERPL-CCNC: 23 UNITS/L
ATRIAL RATE (BPM): 84
BASOPHILS # BLD: 0 K/MCL (ref 0–0.3)
BASOPHILS NFR BLD: 1 %
BILIRUB SERPL-MCNC: 0.3 MG/DL (ref 0.2–1)
BUN SERPL-MCNC: 25 MG/DL (ref 6–20)
BUN/CREAT SERPL: 25 (ref 7–25)
CALCIUM SERPL-MCNC: 9.2 MG/DL (ref 8.4–10.2)
CHLORIDE SERPL-SCNC: 103 MMOL/L (ref 97–110)
CO2 SERPL-SCNC: 24 MMOL/L (ref 21–32)
CREAT SERPL-MCNC: 1 MG/DL (ref 0.51–0.95)
DEPRECATED RDW RBC: 46.1 FL (ref 39–50)
EGFRCR SERPLBLD CKD-EPI 2021: 56 ML/MIN/{1.73_M2}
EOSINOPHIL # BLD: 0.2 K/MCL (ref 0–0.5)
EOSINOPHIL NFR BLD: 4 %
ERYTHROCYTE [DISTWIDTH] IN BLOOD: 14.5 % (ref 11–15)
FASTING DURATION TIME PATIENT: ABNORMAL H
GLOBULIN SER-MCNC: 5.3 G/DL (ref 2–4)
GLUCOSE BLDC GLUCOMTR-MCNC: 131 MG/DL (ref 70–99)
GLUCOSE BLDC GLUCOMTR-MCNC: 261 MG/DL (ref 70–99)
GLUCOSE SERPL-MCNC: 232 MG/DL (ref 70–99)
HCT VFR BLD CALC: 40.2 % (ref 36–46.5)
HGB BLD-MCNC: 12.7 G/DL (ref 12–15.5)
IMM GRANULOCYTES # BLD AUTO: 0 K/MCL (ref 0–0.2)
IMM GRANULOCYTES # BLD: 0 %
LYMPHOCYTES # BLD: 1.6 K/MCL (ref 1–4)
LYMPHOCYTES NFR BLD: 29 %
MCH RBC QN AUTO: 27.4 PG (ref 26–34)
MCHC RBC AUTO-ENTMCNC: 31.6 G/DL (ref 32–36.5)
MCV RBC AUTO: 86.6 FL (ref 78–100)
MONOCYTES # BLD: 0.4 K/MCL (ref 0.3–0.9)
MONOCYTES NFR BLD: 7 %
NEUTROPHILS # BLD: 3.2 K/MCL (ref 1.8–7.7)
NEUTROPHILS NFR BLD: 59 %
NRBC BLD MANUAL-RTO: 0 /100 WBC
P AXIS (DEGREES): 55
PLATELET # BLD AUTO: 208 K/MCL (ref 140–450)
POTASSIUM SERPL-SCNC: 4 MMOL/L (ref 3.4–5.1)
PR-INTERVAL (MSEC): 172
PROT SERPL-MCNC: 8.8 G/DL (ref 6.4–8.2)
QRS-INTERVAL (MSEC): 78
QT-INTERVAL (MSEC): 366
QTC: 433
R AXIS (DEGREES): -27
RBC # BLD: 4.64 MIL/MCL (ref 4–5.2)
REPORT TEXT: NORMAL
SODIUM SERPL-SCNC: 132 MMOL/L (ref 135–145)
T AXIS (DEGREES): 52
TROPONIN I SERPL DL<=0.01 NG/ML-MCNC: 6 NG/L
TROPONIN I SERPL DL<=0.01 NG/ML-MCNC: 7 NG/L
VENTRICULAR RATE EKG/MIN (BPM): 84
WBC # BLD: 5.3 K/MCL (ref 4.2–11)

## 2025-04-28 PROCEDURE — 82962 GLUCOSE BLOOD TEST: CPT

## 2025-04-28 PROCEDURE — 84484 ASSAY OF TROPONIN QUANT: CPT | Performed by: EMERGENCY MEDICINE

## 2025-04-28 PROCEDURE — 96374 THER/PROPH/DIAG INJ IV PUSH: CPT

## 2025-04-28 PROCEDURE — 99223 1ST HOSP IP/OBS HIGH 75: CPT | Performed by: STUDENT IN AN ORGANIZED HEALTH CARE EDUCATION/TRAINING PROGRAM

## 2025-04-28 PROCEDURE — 10002803 HB RX 637

## 2025-04-28 PROCEDURE — G0378 HOSPITAL OBSERVATION PER HR: HCPCS

## 2025-04-28 PROCEDURE — 10002800 HB RX 250 W HCPCS: Performed by: STUDENT IN AN ORGANIZED HEALTH CARE EDUCATION/TRAINING PROGRAM

## 2025-04-28 PROCEDURE — 93010 ELECTROCARDIOGRAM REPORT: CPT | Performed by: INTERNAL MEDICINE

## 2025-04-28 PROCEDURE — 80053 COMPREHEN METABOLIC PANEL: CPT | Performed by: NURSE PRACTITIONER

## 2025-04-28 PROCEDURE — 10004651 HB RX, NO CHARGE ITEM: Performed by: STUDENT IN AN ORGANIZED HEALTH CARE EDUCATION/TRAINING PROGRAM

## 2025-04-28 PROCEDURE — 10002803 HB RX 637: Performed by: STUDENT IN AN ORGANIZED HEALTH CARE EDUCATION/TRAINING PROGRAM

## 2025-04-28 PROCEDURE — 85025 COMPLETE CBC W/AUTO DIFF WBC: CPT | Performed by: NURSE PRACTITIONER

## 2025-04-28 PROCEDURE — 96372 THER/PROPH/DIAG INJ SC/IM: CPT | Performed by: STUDENT IN AN ORGANIZED HEALTH CARE EDUCATION/TRAINING PROGRAM

## 2025-04-28 PROCEDURE — 71045 X-RAY EXAM CHEST 1 VIEW: CPT

## 2025-04-28 PROCEDURE — 36415 COLL VENOUS BLD VENIPUNCTURE: CPT | Performed by: NURSE PRACTITIONER

## 2025-04-28 PROCEDURE — 10002800 HB RX 250 W HCPCS

## 2025-04-28 PROCEDURE — 99285 EMERGENCY DEPT VISIT HI MDM: CPT | Performed by: EMERGENCY MEDICINE

## 2025-04-28 PROCEDURE — 99285 EMERGENCY DEPT VISIT HI MDM: CPT

## 2025-04-28 PROCEDURE — 84484 ASSAY OF TROPONIN QUANT: CPT | Performed by: NURSE PRACTITIONER

## 2025-04-28 PROCEDURE — 93005 ELECTROCARDIOGRAM TRACING: CPT | Performed by: EMERGENCY MEDICINE

## 2025-04-28 RX ORDER — ACETAMINOPHEN 650 MG/1
650 SUPPOSITORY RECTAL EVERY 4 HOURS PRN
Status: DISCONTINUED | OUTPATIENT
Start: 2025-04-28 | End: 2025-04-29 | Stop reason: HOSPADM

## 2025-04-28 RX ORDER — NICOTINE POLACRILEX 4 MG
15 LOZENGE BUCCAL PRN
Status: DISCONTINUED | OUTPATIENT
Start: 2025-04-28 | End: 2025-04-29 | Stop reason: HOSPADM

## 2025-04-28 RX ORDER — DULAGLUTIDE 1.5 MG/.5ML
1.5 INJECTION, SOLUTION SUBCUTANEOUS
COMMUNITY
Start: 2025-03-28

## 2025-04-28 RX ORDER — LATANOPROST 50 UG/ML
1 SOLUTION/ DROPS OPHTHALMIC NIGHTLY
Status: DISCONTINUED | OUTPATIENT
Start: 2025-04-28 | End: 2025-04-29 | Stop reason: HOSPADM

## 2025-04-28 RX ORDER — PRIMIDONE 50 MG/1
50 TABLET ORAL NIGHTLY
COMMUNITY
Start: 2025-01-06

## 2025-04-28 RX ORDER — FLUTICASONE PROPIONATE 50 MCG
2 SPRAY, SUSPENSION (ML) NASAL DAILY
COMMUNITY

## 2025-04-28 RX ORDER — HYDROCHLOROTHIAZIDE 25 MG/1
25 TABLET ORAL DAILY
Status: DISCONTINUED | OUTPATIENT
Start: 2025-04-28 | End: 2025-04-29 | Stop reason: HOSPADM

## 2025-04-28 RX ORDER — PRIMIDONE 250 MG/1
250 TABLET ORAL NIGHTLY
Status: DISCONTINUED | OUTPATIENT
Start: 2025-04-28 | End: 2025-04-29 | Stop reason: HOSPADM

## 2025-04-28 RX ORDER — INSULIN GLARGINE 100 [IU]/ML
50 INJECTION, SOLUTION SUBCUTANEOUS DAILY
COMMUNITY
Start: 2025-03-28

## 2025-04-28 RX ORDER — LATANOPROST 50 UG/ML
1 SOLUTION/ DROPS OPHTHALMIC NIGHTLY
COMMUNITY
Start: 2025-03-28

## 2025-04-28 RX ORDER — HYDROCHLOROTHIAZIDE 25 MG/1
25 TABLET ORAL DAILY
COMMUNITY

## 2025-04-28 RX ORDER — DEXTROSE MONOHYDRATE 25 G/50ML
25 INJECTION, SOLUTION INTRAVENOUS PRN
Status: DISCONTINUED | OUTPATIENT
Start: 2025-04-28 | End: 2025-04-29 | Stop reason: HOSPADM

## 2025-04-28 RX ORDER — ACETAMINOPHEN 325 MG/1
650 TABLET ORAL EVERY 4 HOURS PRN
Status: DISCONTINUED | OUTPATIENT
Start: 2025-04-28 | End: 2025-04-29 | Stop reason: HOSPADM

## 2025-04-28 RX ORDER — ATORVASTATIN CALCIUM 80 MG/1
80 TABLET, FILM COATED ORAL DAILY
COMMUNITY
Start: 2025-03-08

## 2025-04-28 RX ORDER — CYCLOBENZAPRINE HCL 5 MG
5 TABLET ORAL 3 TIMES DAILY PRN
Status: DISCONTINUED | OUTPATIENT
Start: 2025-04-28 | End: 2025-04-29 | Stop reason: HOSPADM

## 2025-04-28 RX ORDER — NICOTINE POLACRILEX 4 MG
30 LOZENGE BUCCAL PRN
Status: DISCONTINUED | OUTPATIENT
Start: 2025-04-28 | End: 2025-04-29 | Stop reason: HOSPADM

## 2025-04-28 RX ORDER — KETOROLAC TROMETHAMINE 15 MG/ML
15 INJECTION, SOLUTION INTRAMUSCULAR; INTRAVENOUS ONCE
Status: COMPLETED | OUTPATIENT
Start: 2025-04-28 | End: 2025-04-28

## 2025-04-28 RX ORDER — PRIMIDONE 250 MG/1
250 TABLET ORAL NIGHTLY
COMMUNITY
Start: 2024-11-28

## 2025-04-28 RX ORDER — ENOXAPARIN SODIUM 100 MG/ML
40 INJECTION SUBCUTANEOUS DAILY
Status: DISCONTINUED | OUTPATIENT
Start: 2025-04-28 | End: 2025-04-29 | Stop reason: HOSPADM

## 2025-04-28 RX ORDER — ATORVASTATIN CALCIUM 80 MG/1
80 TABLET, FILM COATED ORAL DAILY
Status: DISCONTINUED | OUTPATIENT
Start: 2025-04-28 | End: 2025-04-29 | Stop reason: HOSPADM

## 2025-04-28 RX ORDER — 0.9 % SODIUM CHLORIDE 0.9 %
10 VIAL (ML) INJECTION PRN
Status: DISCONTINUED | OUTPATIENT
Start: 2025-04-28 | End: 2025-04-29 | Stop reason: HOSPADM

## 2025-04-28 RX ORDER — PRIMIDONE 50 MG/1
50 TABLET ORAL NIGHTLY
Status: DISCONTINUED | OUTPATIENT
Start: 2025-04-28 | End: 2025-04-29 | Stop reason: HOSPADM

## 2025-04-28 RX ORDER — AMOXICILLIN 250 MG
2 CAPSULE ORAL 2 TIMES DAILY PRN
Status: DISCONTINUED | OUTPATIENT
Start: 2025-04-28 | End: 2025-04-29 | Stop reason: HOSPADM

## 2025-04-28 RX ORDER — LIDOCAINE 4 G/G
1 PATCH TOPICAL ONCE
Status: COMPLETED | OUTPATIENT
Start: 2025-04-28 | End: 2025-04-28

## 2025-04-28 RX ORDER — INSULIN GLARGINE 100 [IU]/ML
50 INJECTION, SOLUTION SUBCUTANEOUS DAILY
Status: DISCONTINUED | OUTPATIENT
Start: 2025-04-28 | End: 2025-04-29 | Stop reason: HOSPADM

## 2025-04-28 RX ORDER — 0.9 % SODIUM CHLORIDE 0.9 %
2 VIAL (ML) INJECTION EVERY 12 HOURS SCHEDULED
Status: DISCONTINUED | OUTPATIENT
Start: 2025-04-28 | End: 2025-04-29 | Stop reason: HOSPADM

## 2025-04-28 RX ORDER — BISACODYL 10 MG
10 SUPPOSITORY, RECTAL RECTAL DAILY PRN
Status: DISCONTINUED | OUTPATIENT
Start: 2025-04-28 | End: 2025-04-29 | Stop reason: HOSPADM

## 2025-04-28 RX ORDER — DEXTROSE MONOHYDRATE 25 G/50ML
12.5 INJECTION, SOLUTION INTRAVENOUS PRN
Status: DISCONTINUED | OUTPATIENT
Start: 2025-04-28 | End: 2025-04-29 | Stop reason: HOSPADM

## 2025-04-28 RX ADMIN — ATORVASTATIN CALCIUM 80 MG: 80 TABLET, FILM COATED ORAL at 21:09

## 2025-04-28 RX ADMIN — PRIMIDONE 50 MG: 50 TABLET ORAL at 21:11

## 2025-04-28 RX ADMIN — LATANOPROST 1 DROP: 50 SOLUTION/ DROPS OPHTHALMIC at 21:13

## 2025-04-28 RX ADMIN — HYDROCHLOROTHIAZIDE 25 MG: 25 TABLET ORAL at 21:10

## 2025-04-28 RX ADMIN — CYCLOBENZAPRINE HYDROCHLORIDE 5 MG: 5 TABLET, FILM COATED ORAL at 21:38

## 2025-04-28 RX ADMIN — PRIMIDONE 250 MG: 250 TABLET ORAL at 21:11

## 2025-04-28 RX ADMIN — LIDOCAINE 1 PATCH: 4 PATCH TOPICAL at 11:32

## 2025-04-28 RX ADMIN — INSULIN GLARGINE 50 UNITS: 100 INJECTION, SOLUTION SUBCUTANEOUS at 21:11

## 2025-04-28 RX ADMIN — SODIUM CHLORIDE, PRESERVATIVE FREE 2 ML: 5 INJECTION INTRAVENOUS at 21:13

## 2025-04-28 RX ADMIN — ENOXAPARIN SODIUM 40 MG: 100 INJECTION SUBCUTANEOUS at 21:10

## 2025-04-28 RX ADMIN — KETOROLAC TROMETHAMINE 15 MG: 15 INJECTION, SOLUTION INTRAMUSCULAR; INTRAVENOUS at 11:56

## 2025-04-28 SDOH — SOCIAL STABILITY: SOCIAL INSECURITY: HOW OFTEN DOES ANYONE, INCLUDING FAMILY AND FRIENDS, SCREAM OR CURSE AT YOU?: NEVER

## 2025-04-28 SDOH — ECONOMIC STABILITY: GENERAL

## 2025-04-28 SDOH — SOCIAL STABILITY: SOCIAL NETWORK: SUPPORT SYSTEMS: FRIENDS

## 2025-04-28 SDOH — HEALTH STABILITY: PHYSICAL HEALTH: DO YOU HAVE SERIOUS DIFFICULTY WALKING OR CLIMBING STAIRS?: NO

## 2025-04-28 SDOH — SOCIAL STABILITY: SOCIAL INSECURITY: HOW OFTEN DOES ANYONE, INCLUDING FAMILY AND FRIENDS, PHYSICALLY HURT YOU?: NEVER

## 2025-04-28 SDOH — HEALTH STABILITY: PHYSICAL HEALTH: DO YOU HAVE DIFFICULTY DRESSING OR BATHING?: NO

## 2025-04-28 SDOH — ECONOMIC STABILITY: INCOME INSECURITY: IN THE PAST 12 MONTHS, HAS THE ELECTRIC, GAS, OIL, OR WATER COMPANY THREATENED TO SHUT OFF SERVICE IN YOUR HOME?: NO

## 2025-04-28 SDOH — SOCIAL STABILITY: SOCIAL NETWORK
HOW OFTEN DO YOU SEE OR TALK TO PEOPLE THAT YOU CARE ABOUT AND FEEL CLOSE TO? (FOR EXAMPLE: TALKING TO FRIENDS ON THE PHONE, VISITING FRIENDS OR FAMILY, GOING TO CHURCH OR CLUB MEETINGS): 5 OR MORE TIMES A WEEK

## 2025-04-28 SDOH — ECONOMIC STABILITY: HOUSING INSECURITY: DO YOU HAVE PROBLEMS WITH ANY OF THE FOLLOWING?: NONE OF THE ABOVE

## 2025-04-28 SDOH — HEALTH STABILITY: GENERAL: BECAUSE OF A PHYSICAL, MENTAL, OR EMOTIONAL CONDITION, DO YOU HAVE DIFFICULTY DOING ERRANDS ALONE?: NO

## 2025-04-28 SDOH — SOCIAL STABILITY: SOCIAL INSECURITY: HOW OFTEN DOES ANYONE, INCLUDING FAMILY AND FRIENDS, INSULT OR TALK DOWN TO YOU?: NEVER

## 2025-04-28 SDOH — ECONOMIC STABILITY: FOOD INSECURITY: WITHIN THE PAST 12 MONTHS, THE FOOD YOU BOUGHT JUST DIDN'T LAST AND YOU DIDN'T HAVE MONEY TO GET MORE.: NEVER TRUE

## 2025-04-28 SDOH — ECONOMIC STABILITY: HOUSING INSECURITY: WHAT IS YOUR LIVING SITUATION TODAY?: I HAVE A STEADY PLACE TO LIVE

## 2025-04-28 SDOH — SOCIAL STABILITY: SOCIAL INSECURITY: HOW OFTEN DOES ANYONE, INCLUDING FAMILY AND FRIENDS, THREATEN YOU WITH HARM?: NEVER

## 2025-04-28 SDOH — ECONOMIC STABILITY: HOUSING INSECURITY: WHAT IS YOUR LIVING SITUATION TODAY?: APARTMENT

## 2025-04-28 SDOH — ECONOMIC STABILITY: TRANSPORTATION INSECURITY
IN THE PAST 12 MONTHS, HAS LACK OF RELIABLE TRANSPORTATION KEPT YOU FROM MEDICAL APPOINTMENTS, MEETINGS, WORK OR FROM GETTING THINGS NEEDED FOR DAILY LIVING?: NO

## 2025-04-28 SDOH — HEALTH STABILITY: GENERAL
BECAUSE OF A PHYSICAL, MENTAL, OR EMOTIONAL CONDITION, DO YOU HAVE SERIOUS DIFFICULTY CONCENTRATING, REMEMBERING OR MAKING DECISIONS?: NO

## 2025-04-28 SDOH — ECONOMIC STABILITY: HOUSING INSECURITY: WHAT IS YOUR LIVING SITUATION TODAY?: ALONE

## 2025-04-28 SDOH — ECONOMIC STABILITY: HOUSING INSECURITY: DO YOU HAVE PROBLEMS WITH ANY OF THE FOLLOWING?: PATIENT UNABLE TO ANSWER

## 2025-04-28 ASSESSMENT — PATIENT HEALTH QUESTIONNAIRE - PHQ9
SUM OF ALL RESPONSES TO PHQ9 QUESTIONS 1 AND 2: 0
1. LITTLE INTEREST OR PLEASURE IN DOING THINGS: NOT AT ALL
CLINICAL INTERPRETATION OF PHQ2 SCORE: NO FURTHER SCREENING NEEDED
2. FEELING DOWN, DEPRESSED OR HOPELESS: NOT AT ALL
SUM OF ALL RESPONSES TO PHQ9 QUESTIONS 1 AND 2: 0
IS PATIENT ABLE TO COMPLETE PHQ2 OR PHQ9: YES

## 2025-04-28 ASSESSMENT — LIFESTYLE VARIABLES
HOW OFTEN DO YOU HAVE A DRINK CONTAINING ALCOHOL: NEVER
HOW MANY STANDARD DRINKS CONTAINING ALCOHOL DO YOU HAVE ON A TYPICAL DAY: 0,1 OR 2
ALCOHOL_USE_STATUS: NO OR LOW RISK WITH VALIDATED TOOL

## 2025-04-28 ASSESSMENT — ORIENTATION MEMORY CONCENTRATION TEST (OMCT)
WHAT TIME IS IT (NO WATCH OR CLOCK): CORRECT
COUNT BACKWARDS FROM 20 TO 1: 1 ERROR
REPEAT THE NAME AND ADDRESS I ASKED YOU TO REMEMBER: 1 ERROR
SAY THE MONTHS IN REVERSE ORDER STARTING WITH LAST MONTH: 1 ERROR
OMCT SCORE: 6
OMCT INTERPRETATION: 0-6: NO SIGNIFICANT IMPAIRMENT
WHAT MONTH IS IT NOW: CORRECT
WHAT YEAR IS IT NOW (MUST BE EXACT): CORRECT

## 2025-04-28 ASSESSMENT — PAIN SCALES - GENERAL
PAINLEVEL_OUTOF10: 0
PAINLEVEL_OUTOF10: 7

## 2025-04-28 ASSESSMENT — HEART SCORE
HEART SCORE: 5
EKG: NORMAL
RISK FACTORS: 1-2 RISK FACTORS
RISK FACTORS: EQUAL OR GREATER  THAN 3 RISK FACTORS OR HISTORY OF ATHEROSCLEROTIC DISEASE
AGE: EQUAL OR GREATER THAN 65
EKG: NORMAL
HISTORY: MODERATELY SUSPICIOUS
HISTORY: MODERATELY SUSPICIOUS
TROPONIN: EQUAL OR LESS THAN NORMAL LIMIT
AGE: EQUAL OR GREATER THAN 65

## 2025-04-28 ASSESSMENT — COLUMBIA-SUICIDE SEVERITY RATING SCALE - C-SSRS
6. HAVE YOU EVER DONE ANYTHING, STARTED TO DO ANYTHING, OR PREPARED TO DO ANYTHING TO END YOUR LIFE?: NO
2. HAVE YOU ACTUALLY HAD ANY THOUGHTS OF KILLING YOURSELF?: NO
IS THE PATIENT ABLE TO COMPLETE C-SSRS: YES
1. WITHIN THE PAST MONTH, HAVE YOU WISHED YOU WERE DEAD OR WISHED YOU COULD GO TO SLEEP AND NOT WAKE UP?: NO

## 2025-04-28 ASSESSMENT — ACTIVITIES OF DAILY LIVING (ADL)
ADL_SHORT_OF_BREATH: NO
ADL_SCORE: 12
RECENT_DECLINE_ADL: NO
ADL_BEFORE_ADMISSION: INDEPENDENT

## 2025-04-29 VITALS
HEIGHT: 61 IN | HEART RATE: 90 BPM | OXYGEN SATURATION: 98 % | RESPIRATION RATE: 16 BRPM | BODY MASS INDEX: 34.92 KG/M2 | DIASTOLIC BLOOD PRESSURE: 72 MMHG | WEIGHT: 184.97 LBS | SYSTOLIC BLOOD PRESSURE: 137 MMHG | TEMPERATURE: 98.6 F

## 2025-04-29 PROBLEM — R25.1 TREMORS OF NERVOUS SYSTEM: Status: ACTIVE | Noted: 2025-04-29

## 2025-04-29 PROBLEM — I10 HYPERTENSION: Status: ACTIVE | Noted: 2025-04-29

## 2025-04-29 PROBLEM — E11.9 TYPE 2 DIABETES MELLITUS (CMD): Status: ACTIVE | Noted: 2025-04-29

## 2025-04-29 PROBLEM — E78.5 HYPERLIPIDEMIA: Status: ACTIVE | Noted: 2025-04-29

## 2025-04-29 LAB
ALBUMIN SERPL-MCNC: 3.2 G/DL (ref 3.4–5)
ALBUMIN/GLOB SERPL: 0.7 {RATIO} (ref 1–2.4)
ALP SERPL-CCNC: 90 UNITS/L (ref 45–117)
ALT SERPL-CCNC: 26 UNITS/L
ANION GAP SERPL CALC-SCNC: 7 MMOL/L (ref 7–19)
AST SERPL-CCNC: 18 UNITS/L
BASOPHILS # BLD: 0 K/MCL (ref 0–0.3)
BASOPHILS NFR BLD: 0 %
BILIRUB SERPL-MCNC: 0.5 MG/DL (ref 0.2–1)
BUN SERPL-MCNC: 21 MG/DL (ref 6–20)
BUN/CREAT SERPL: 22 (ref 7–25)
CALCIUM SERPL-MCNC: 9.5 MG/DL (ref 8.4–10.2)
CHLORIDE SERPL-SCNC: 102 MMOL/L (ref 97–110)
CO2 SERPL-SCNC: 27 MMOL/L (ref 21–32)
CREAT SERPL-MCNC: 0.95 MG/DL (ref 0.51–0.95)
DEPRECATED RDW RBC: 44.5 FL (ref 39–50)
EGFRCR SERPLBLD CKD-EPI 2021: 59 ML/MIN/{1.73_M2}
EOSINOPHIL # BLD: 0.2 K/MCL (ref 0–0.5)
EOSINOPHIL NFR BLD: 4 %
ERYTHROCYTE [DISTWIDTH] IN BLOOD: 14.4 % (ref 11–15)
FASTING DURATION TIME PATIENT: ABNORMAL H
GLOBULIN SER-MCNC: 4.9 G/DL (ref 2–4)
GLUCOSE BLDC GLUCOMTR-MCNC: 139 MG/DL (ref 70–99)
GLUCOSE BLDC GLUCOMTR-MCNC: 278 MG/DL (ref 70–99)
GLUCOSE SERPL-MCNC: 137 MG/DL (ref 70–99)
HCT VFR BLD CALC: 38.4 % (ref 36–46.5)
HGB BLD-MCNC: 12.3 G/DL (ref 12–15.5)
IMM GRANULOCYTES # BLD AUTO: 0 K/MCL (ref 0–0.2)
IMM GRANULOCYTES # BLD: 0 %
LYMPHOCYTES # BLD: 1.7 K/MCL (ref 1–4)
LYMPHOCYTES NFR BLD: 34 %
MAGNESIUM SERPL-MCNC: 2.3 MG/DL (ref 1.7–2.4)
MCH RBC QN AUTO: 27.2 PG (ref 26–34)
MCHC RBC AUTO-ENTMCNC: 32 G/DL (ref 32–36.5)
MCV RBC AUTO: 85 FL (ref 78–100)
MONOCYTES # BLD: 0.4 K/MCL (ref 0.3–0.9)
MONOCYTES NFR BLD: 8 %
NEUTROPHILS # BLD: 2.8 K/MCL (ref 1.8–7.7)
NEUTROPHILS NFR BLD: 54 %
NRBC BLD MANUAL-RTO: 0 /100 WBC
PLATELET # BLD AUTO: 196 K/MCL (ref 140–450)
POTASSIUM SERPL-SCNC: 3.4 MMOL/L (ref 3.4–5.1)
PROT SERPL-MCNC: 8.1 G/DL (ref 6.4–8.2)
RBC # BLD: 4.52 MIL/MCL (ref 4–5.2)
SODIUM SERPL-SCNC: 133 MMOL/L (ref 135–145)
WBC # BLD: 5.2 K/MCL (ref 4.2–11)

## 2025-04-29 PROCEDURE — 82962 GLUCOSE BLOOD TEST: CPT

## 2025-04-29 PROCEDURE — 10004651 HB RX, NO CHARGE ITEM: Performed by: STUDENT IN AN ORGANIZED HEALTH CARE EDUCATION/TRAINING PROGRAM

## 2025-04-29 PROCEDURE — 36415 COLL VENOUS BLD VENIPUNCTURE: CPT | Performed by: STUDENT IN AN ORGANIZED HEALTH CARE EDUCATION/TRAINING PROGRAM

## 2025-04-29 PROCEDURE — 96372 THER/PROPH/DIAG INJ SC/IM: CPT | Performed by: STUDENT IN AN ORGANIZED HEALTH CARE EDUCATION/TRAINING PROGRAM

## 2025-04-29 PROCEDURE — 85025 COMPLETE CBC W/AUTO DIFF WBC: CPT | Performed by: STUDENT IN AN ORGANIZED HEALTH CARE EDUCATION/TRAINING PROGRAM

## 2025-04-29 PROCEDURE — 99233 SBSQ HOSP IP/OBS HIGH 50: CPT | Performed by: INTERNAL MEDICINE

## 2025-04-29 PROCEDURE — 83735 ASSAY OF MAGNESIUM: CPT | Performed by: STUDENT IN AN ORGANIZED HEALTH CARE EDUCATION/TRAINING PROGRAM

## 2025-04-29 PROCEDURE — G0378 HOSPITAL OBSERVATION PER HR: HCPCS

## 2025-04-29 PROCEDURE — 10002803 HB RX 637: Performed by: STUDENT IN AN ORGANIZED HEALTH CARE EDUCATION/TRAINING PROGRAM

## 2025-04-29 PROCEDURE — 80053 COMPREHEN METABOLIC PANEL: CPT | Performed by: STUDENT IN AN ORGANIZED HEALTH CARE EDUCATION/TRAINING PROGRAM

## 2025-04-29 PROCEDURE — 10002803 HB RX 637: Performed by: INTERNAL MEDICINE

## 2025-04-29 PROCEDURE — 10002800 HB RX 250 W HCPCS: Performed by: STUDENT IN AN ORGANIZED HEALTH CARE EDUCATION/TRAINING PROGRAM

## 2025-04-29 RX ORDER — POTASSIUM CHLORIDE 750 MG/1
10 CAPSULE, EXTENDED RELEASE ORAL DAILY
Qty: 30 CAPSULE | Refills: 0 | Status: SHIPPED | OUTPATIENT
Start: 2025-04-29 | End: 2025-04-29 | Stop reason: HOSPADM

## 2025-04-29 RX ORDER — ACETAMINOPHEN 325 MG/1
650 TABLET ORAL EVERY 6 HOURS PRN
Status: SHIPPED | COMMUNITY
Start: 2025-04-29

## 2025-04-29 RX ORDER — IBUPROFEN 200 MG
200 TABLET ORAL EVERY 8 HOURS PRN
Status: SHIPPED | COMMUNITY
Start: 2025-04-29 | End: 2025-05-02

## 2025-04-29 RX ORDER — CYCLOBENZAPRINE HCL 5 MG
5 TABLET ORAL 3 TIMES DAILY PRN
Qty: 30 TABLET | Refills: 0 | Status: ACTIVE | OUTPATIENT
Start: 2025-04-29

## 2025-04-29 RX ORDER — POTASSIUM CHLORIDE 1500 MG/1
40 TABLET, EXTENDED RELEASE ORAL ONCE
Status: COMPLETED | OUTPATIENT
Start: 2025-04-29 | End: 2025-04-29

## 2025-04-29 RX ORDER — POTASSIUM CHLORIDE 750 MG/1
10 CAPSULE, EXTENDED RELEASE ORAL DAILY
Qty: 30 CAPSULE | Refills: 0 | Status: ACTIVE | OUTPATIENT
Start: 2025-04-29

## 2025-04-29 RX ORDER — IBUPROFEN 200 MG
200 TABLET ORAL EVERY 8 HOURS PRN
Status: SHIPPED | COMMUNITY
Start: 2025-04-29 | End: 2025-04-29

## 2025-04-29 RX ADMIN — ATORVASTATIN CALCIUM 80 MG: 80 TABLET, FILM COATED ORAL at 08:20

## 2025-04-29 RX ADMIN — POTASSIUM CHLORIDE 40 MEQ: 1500 TABLET, EXTENDED RELEASE ORAL at 11:05

## 2025-04-29 RX ADMIN — ENOXAPARIN SODIUM 40 MG: 100 INJECTION SUBCUTANEOUS at 08:20

## 2025-04-29 RX ADMIN — HYDROCHLOROTHIAZIDE 25 MG: 25 TABLET ORAL at 08:20

## 2025-04-29 RX ADMIN — DICLOFENAC SODIUM 2 G: 10 GEL TOPICAL at 05:13

## 2025-04-29 RX ADMIN — SODIUM CHLORIDE, PRESERVATIVE FREE 2 ML: 5 INJECTION INTRAVENOUS at 08:20

## 2025-04-29 ASSESSMENT — PAIN SCALES - GENERAL
PAINLEVEL_OUTOF10: 0
PAINLEVEL_OUTOF10: 5
PAINLEVEL_OUTOF10: 7
PAINLEVEL_OUTOF10: 0
PAINLEVEL_OUTOF10: 7

## 2025-05-01 ENCOUNTER — TELEPHONE (OUTPATIENT)
Dept: CARE COORDINATION | Age: 85
End: 2025-05-01

## 2025-05-08 ENCOUNTER — TELEPHONE (OUTPATIENT)
Dept: CARE COORDINATION | Age: 85
End: 2025-05-08

## 2025-05-15 ENCOUNTER — TELEPHONE (OUTPATIENT)
Dept: CARE COORDINATION | Age: 85
End: 2025-05-15

## 2025-05-22 ENCOUNTER — TELEPHONE (OUTPATIENT)
Dept: CARE COORDINATION | Age: 85
End: 2025-05-22

## 2025-05-29 ENCOUNTER — TELEPHONE (OUTPATIENT)
Dept: CARE COORDINATION | Age: 85
End: 2025-05-29

## (undated) DIAGNOSIS — E11.9 DIABETES MELLITUS TYPE 2 WITHOUT RETINOPATHY (HCC): ICD-10-CM

## (undated) DIAGNOSIS — E11.9 TYPE 2 DIABETES MELLITUS WITHOUT COMPLICATION, WITH LONG-TERM CURRENT USE OF INSULIN (HCC): Primary | ICD-10-CM

## (undated) DIAGNOSIS — Z02.9 ENCOUNTERS FOR UNSPECIFIED ADMINISTRATIVE PURPOSE: ICD-10-CM

## (undated) DIAGNOSIS — M70.62 PERI-TROCHANTERIC LEFT HIP TENDINITIS: ICD-10-CM

## (undated) DIAGNOSIS — R25.1 TREMOR: ICD-10-CM

## (undated) DIAGNOSIS — N18.30 STAGE 3 CHRONIC KIDNEY DISEASE, UNSPECIFIED WHETHER STAGE 3A OR 3B CKD (HCC): ICD-10-CM

## (undated) DIAGNOSIS — Z79.4 TYPE 2 DIABETES MELLITUS WITHOUT COMPLICATION, WITH LONG-TERM CURRENT USE OF INSULIN (HCC): Primary | ICD-10-CM

## (undated) DIAGNOSIS — I10 PRIMARY HYPERTENSION: ICD-10-CM

## (undated) NOTE — Clinical Note
Thank you for the consult. I saw Ms Shahla Brooks in the endocrine/diabetes clinic today. Please see attached my note. Please feel free to contact me with any questions. Thanks!

## (undated) NOTE — MR AVS SNAPSHOT
[address]  [address]  [phone]               Thank you for choosing us for your health care visit with Klever Torres DPM.  We are glad to serve you and happy to provide yo Support Staff. Remember, MyChart is NOT to be used for urgent needs. For medical emergencies, dial 911.         Educational Information     Healthy Diet and Regular Exercise  [employer] for making healthy food choices  -   Enjoy

## (undated) NOTE — Clinical Note
JOSIE, TCM call made, see notes. NCM confirmed with patient that she has a 6 month follow up scheduled on 3/8/2022 at 10:00, message sent to MD's office requesting a change in visit type to Nancy Berry, Patient may benefit from further diabetic education.

## (undated) NOTE — LETTER
April 7, 2021    Idalia Mckeon MD  [address] [facility]     Patient: Jerry Mcbride   YOB: 1940   Date of Visit: 4/7/2021       Dear Dr. Rosalva Goode MD:    Thank you for referring Jerry Mcbride to me for evaluation.  Here is History   Problem Relation Age of Onset   • Heart Disorder Father    • Heart Disorder Mother    • Diabetes Mother    • Breast Cancer Mother 61   • Diabetes Brother    • Heart Disorder Brother    • Breast Cancer Self 79   • Glaucoma Neg    • Macular degener Penicillins             RASH  [employer] Multivit*    HIVES  Iodine I [address] Tositu*        ROS:     ROS     Positive for: Endocrine, Eyes    Negative for: Constitutional, Gastrointestinal, Neurological, Skin, Genitourinary, Musculoskeletal, HENT, Cardiova Right -1.75 +1.50 180 20/20 +2.50 20/20    Left -1.75 +1.75 170 20/20- +2.50 20/20          Final Rx       [facility] [employer] [place] [facility] [place]    Right -1.75 +1.50 180 20/20 +2.50 20/20    Left -1.75 +1.75 170 20/20- +2.50 20/20    Type: Progressive b electronically generated by: Ana Paula Tucker